# Patient Record
Sex: FEMALE | Race: WHITE | URBAN - METROPOLITAN AREA
[De-identification: names, ages, dates, MRNs, and addresses within clinical notes are randomized per-mention and may not be internally consistent; named-entity substitution may affect disease eponyms.]

---

## 2023-11-02 LAB
NON-UH HIE A/G RATIO: 1.2
NON-UH HIE ALB: 4 G/DL (ref 3.4–5)
NON-UH HIE ALK PHOS: 136 UNIT/L (ref 45–117)
NON-UH HIE BILIRUBIN, TOTAL: 0.5 MG/DL (ref 0.3–1.2)
NON-UH HIE BUN/CREAT RATIO: 10
NON-UH HIE BUN: 8 MG/DL (ref 9–23)
NON-UH HIE CALCIUM: 9.8 MG/DL (ref 8.7–10.4)
NON-UH HIE CALCULATED LDL CHOLESTEROL: 84 MG/DL (ref 60–130)
NON-UH HIE CALCULATED OSMOLALITY: 280 MOSM/KG (ref 275–295)
NON-UH HIE CHLORIDE: 101 MMOL/L (ref 98–107)
NON-UH HIE CHOLESTEROL: 178 MG/DL (ref 100–200)
NON-UH HIE CO2, VENOUS: 26 MMOL/L (ref 20–31)
NON-UH HIE CREATININE, URINE MG/DL: 66.2 MG/DL
NON-UH HIE CREATININE: 0.8 MG/DL (ref 0.5–0.8)
NON-UH HIE GFR AA: >60
NON-UH HIE GLOBULIN: 3.2 G/DL
NON-UH HIE GLOMERULAR FILTRATION RATE: >60 ML/MIN/1.73M?
NON-UH HIE GLUCOSE: 140 MG/DL (ref 74–106)
NON-UH HIE GOT: 16 UNIT/L (ref 15–37)
NON-UH HIE GPT: 22 UNIT/L (ref 10–49)
NON-UH HIE HCT: 42 % (ref 36–46)
NON-UH HIE HDL CHOLESTEROL: 50 MG/DL (ref 40–60)
NON-UH HIE HGB A1C: 6.7 %
NON-UH HIE HGB: 14.3 G/DL (ref 12–16)
NON-UH HIE INSTR WBC ND: 7.1
NON-UH HIE K: 4 MMOL/L (ref 3.5–5.1)
NON-UH HIE MCH: 32.4 PG (ref 27–34)
NON-UH HIE MCHC: 34 G/DL (ref 32–37)
NON-UH HIE MCV: 95.2 FL (ref 80–100)
NON-UH HIE MICROALBUMIN, URINE MG/L: <3 MG/L
NON-UH HIE MICROALBUMIN/CREATININE RATIO: <4 MG MALB/GM CREAT (ref 0–30)
NON-UH HIE MPV: 7.9 FL (ref 7.4–10.4)
NON-UH HIE NA: 140 MMOL/L (ref 135–145)
NON-UH HIE PLATELET: 247 X10 (ref 150–450)
NON-UH HIE RBC: 4.41 X10 (ref 4.2–5.4)
NON-UH HIE RDW: 12.9 % (ref 11.5–14.5)
NON-UH HIE TOTAL CHOL/HDL CHOL RATIO: 3.6
NON-UH HIE TOTAL PROTEIN: 7.2 G/DL (ref 5.7–8.2)
NON-UH HIE TRIGLYCERIDES: 221 MG/DL (ref 30–150)
NON-UH HIE WBC: 7.1 X10 (ref 4.5–11)

## 2023-11-20 PROBLEM — G47.33 OBSTRUCTIVE SLEEP APNEA: Status: ACTIVE | Noted: 2023-11-20

## 2023-11-20 PROBLEM — G56.00 CARPAL TUNNEL SYNDROME: Status: ACTIVE | Noted: 2023-11-20

## 2023-11-20 PROBLEM — K76.0 FATTY LIVER: Status: ACTIVE | Noted: 2023-11-20

## 2023-11-20 PROBLEM — K21.9 GERD (GASTROESOPHAGEAL REFLUX DISEASE): Status: ACTIVE | Noted: 2023-11-20

## 2023-11-20 PROBLEM — E11.9 DIABETES MELLITUS (MULTI): Status: ACTIVE | Noted: 2023-11-20

## 2023-11-20 PROBLEM — E55.9 VITAMIN D DEFICIENCY: Status: ACTIVE | Noted: 2023-11-20

## 2023-11-20 PROBLEM — F32.A DEPRESSION: Status: ACTIVE | Noted: 2023-11-20

## 2023-11-20 PROBLEM — E78.1 HYPERTRIGLYCERIDEMIA: Status: ACTIVE | Noted: 2023-11-20

## 2023-11-20 PROBLEM — I10 HYPERTENSION: Status: ACTIVE | Noted: 2023-11-20

## 2023-11-20 PROBLEM — C50.512 BREAST CANCER OF LOWER-OUTER QUADRANT OF LEFT FEMALE BREAST (MULTI): Status: ACTIVE | Noted: 2023-11-20

## 2023-11-20 RX ORDER — GLIPIZIDE 5 MG/1
1 TABLET, FILM COATED, EXTENDED RELEASE ORAL DAILY
COMMUNITY
Start: 2013-06-14 | End: 2023-11-21 | Stop reason: ALTCHOICE

## 2023-11-20 RX ORDER — VIT C/E/ZN/COPPR/LUTEIN/ZEAXAN 250MG-90MG
2 CAPSULE ORAL DAILY
COMMUNITY

## 2023-11-20 RX ORDER — HYDROCHLOROTHIAZIDE 25 MG/1
1 TABLET ORAL DAILY
COMMUNITY
Start: 2021-12-14 | End: 2024-01-08 | Stop reason: SDUPTHER

## 2023-11-20 RX ORDER — SERTRALINE HYDROCHLORIDE 25 MG/1
25 TABLET, FILM COATED ORAL DAILY
COMMUNITY
Start: 2020-08-24 | End: 2023-12-12 | Stop reason: SDUPTHER

## 2023-11-20 RX ORDER — PRAVASTATIN SODIUM 20 MG/1
20 TABLET ORAL NIGHTLY
COMMUNITY
Start: 2020-09-30 | End: 2023-11-21 | Stop reason: DRUGHIGH

## 2023-11-20 RX ORDER — METFORMIN HYDROCHLORIDE 500 MG/1
500 TABLET, EXTENDED RELEASE ORAL
COMMUNITY
Start: 2023-03-05 | End: 2023-11-21 | Stop reason: SDUPTHER

## 2023-11-21 ENCOUNTER — OFFICE VISIT (OUTPATIENT)
Dept: PRIMARY CARE | Facility: CLINIC | Age: 58
End: 2023-11-21
Payer: COMMERCIAL

## 2023-11-21 VITALS
HEART RATE: 98 BPM | OXYGEN SATURATION: 99 % | SYSTOLIC BLOOD PRESSURE: 128 MMHG | WEIGHT: 164 LBS | BODY MASS INDEX: 30.18 KG/M2 | HEIGHT: 62 IN | TEMPERATURE: 97.8 F | DIASTOLIC BLOOD PRESSURE: 88 MMHG

## 2023-11-21 DIAGNOSIS — E11.69 TYPE 2 DIABETES MELLITUS WITH OTHER SPECIFIED COMPLICATION, WITHOUT LONG-TERM CURRENT USE OF INSULIN (MULTI): Primary | ICD-10-CM

## 2023-11-21 DIAGNOSIS — E55.9 VITAMIN D DEFICIENCY: ICD-10-CM

## 2023-11-21 DIAGNOSIS — E78.1 HYPERTRIGLYCERIDEMIA: ICD-10-CM

## 2023-11-21 PROCEDURE — 3074F SYST BP LT 130 MM HG: CPT | Performed by: INTERNAL MEDICINE

## 2023-11-21 PROCEDURE — 3079F DIAST BP 80-89 MM HG: CPT | Performed by: INTERNAL MEDICINE

## 2023-11-21 PROCEDURE — 99214 OFFICE O/P EST MOD 30 MIN: CPT | Performed by: INTERNAL MEDICINE

## 2023-11-21 PROCEDURE — 1036F TOBACCO NON-USER: CPT | Performed by: INTERNAL MEDICINE

## 2023-11-21 RX ORDER — PANTOPRAZOLE SODIUM 40 MG/1
40 TABLET, DELAYED RELEASE ORAL
COMMUNITY
Start: 2023-10-19

## 2023-11-21 RX ORDER — METFORMIN HYDROCHLORIDE 500 MG/1
500 TABLET, EXTENDED RELEASE ORAL
Qty: 90 TABLET | Refills: 3 | Status: SHIPPED | OUTPATIENT
Start: 2023-11-21 | End: 2023-11-21 | Stop reason: SDUPTHER

## 2023-11-21 RX ORDER — PRAVASTATIN SODIUM 40 MG/1
40 TABLET ORAL DAILY
Qty: 90 TABLET | Refills: 3 | Status: SHIPPED | OUTPATIENT
Start: 2023-11-21

## 2023-11-21 RX ORDER — METFORMIN HYDROCHLORIDE 500 MG/1
TABLET, EXTENDED RELEASE ORAL
Qty: 90 TABLET | Refills: 3 | Status: SHIPPED | OUTPATIENT
Start: 2023-11-21 | End: 2023-12-13 | Stop reason: SDUPTHER

## 2023-11-21 ASSESSMENT — ENCOUNTER SYMPTOMS
HEADACHES: 0
VISUAL CHANGE: 0
CONFUSION: 0
HUNGER: 0
WEIGHT LOSS: 0
SEIZURES: 0
BLACKOUTS: 0
BLURRED VISION: 0
SPEECH DIFFICULTY: 0
POLYPHAGIA: 1
DIZZINESS: 0
FATIGUE: 1
NERVOUS/ANXIOUS: 0
POLYDIPSIA: 1
TREMORS: 0
SWEATS: 0
WEAKNESS: 0

## 2023-11-21 ASSESSMENT — PAIN SCALES - GENERAL: PAINLEVEL: 0-NO PAIN

## 2023-11-21 ASSESSMENT — PATIENT HEALTH QUESTIONNAIRE - PHQ9
1. LITTLE INTEREST OR PLEASURE IN DOING THINGS: NOT AT ALL
SUM OF ALL RESPONSES TO PHQ9 QUESTIONS 1 & 2: 0
2. FEELING DOWN, DEPRESSED OR HOPELESS: NOT AT ALL

## 2023-11-21 NOTE — PROGRESS NOTES
"Chief Complaint   Patient presents with    Follow-up     Pt here for 6 mo FUV.  Pt needs refill on Metformin.     Last seen 2023.    no fever/chills.  Good appetite.   Patient denies chest pain, pressure, palpitations, or shortness of breath  No GI or  symptoms  GI advised to stop glipizide  Ophtho done ok.      Past Medical History  Left breast cancer  status post lumpectomy chemotherapy radiation Dr Oh (previous oncologist Dr Calero)  Diabetes mellitus type 2 diagnosed in   Hypertriglyceridemia  Palpitations, Dr Frederick [place].   Vitamin D deficiency  Carpal tunnel syndrome  Bilateral oophorectomy, uterus intact. Previous gynecologist Dr. Barrera  Sleep apnea CPAP 6  Fatty liver: FibroScan 2020 Dr Mahmood     Allergy Keflex  Social history former smoker.  she has 2 granddaughters. Quit smoking age 23     Family history: Mother is a patient. Father  70 with colon cancer, atrial fibrillation, and cerebral hemorrhage. Father had high triglycerides    Blood pressure 128/88, pulse 98, temperature 36.6 °C (97.8 °F), height 1.575 m (5' 2\"), weight 74.4 kg (164 lb), SpO2 99 %.  Body mass index is 30 kg/m².    Vital reviewed  Neck: no cervical/clavicular adenopathy  CV: RRR S1 S2 normal. No murmur. No carotid bruit.   Lungs: CTA without wrr. Breath sounds symmetric  Abdomen: normoactive. Soft, nontender. No mass.  Extremities: no pretibial edema  Neuro: speech intact. No facial asymmetry   Skin no rash noted.     Labs 2023 D, bmp, A1c    2023 lipid, A1c, MA, cmp, cbc  178/50/84/221  Glucose elevated 140 cr 0.8 lfts negative  A1c 6.7  MA ratio <4  Wbc 7.1 hg 14.3 platelet 247    Heather was seen today for follow-up.  Diagnoses and all orders for this visit:  Type 2 diabetes mellitus with other specified complication, without long-term current use of insulin (CMS/[facility]) (Primary)  -     Discontinue: metFORMIN  mg 24 hr tablet; Take 1 tablet (500 mg) by mouth once daily in the " evening. Take with meals.  -     metFORMIN  mg 24 hr tablet; 2 po qd  -     Hemoglobin A1C; Future  -     Comprehensive Metabolic Panel; Future  Hypertriglyceridemia  -     pravastatin (Pravachol) 40 mg tablet; Take 1 tablet (40 mg) by mouth once daily.  -     Lipid Panel; Future  Vitamin D deficiency  -     Vitamin D 25-Hydroxy,Total (for eval of Vitamin D levels); Future    Increase metformin to 2 po every day  Discontinue glipizide  Ldl above goal increase pravastatin from 20 to 40 mg qd  Future labs         Left breast cancer (lumpectomy) last mammogram 11/2022- appt for mammogram next week.  Pap per gyn  Colonoscopy 11/2023 Dr Mahmood  Had flu vaccine

## 2023-12-06 ENCOUNTER — PATIENT MESSAGE (OUTPATIENT)
Dept: PRIMARY CARE | Facility: CLINIC | Age: 58
End: 2023-12-06
Payer: COMMERCIAL

## 2023-12-06 DIAGNOSIS — F32.A DEPRESSION, UNSPECIFIED DEPRESSION TYPE: Primary | ICD-10-CM

## 2023-12-11 ENCOUNTER — PATIENT MESSAGE (OUTPATIENT)
Dept: PRIMARY CARE | Facility: CLINIC | Age: 58
End: 2023-12-11
Payer: COMMERCIAL

## 2023-12-11 DIAGNOSIS — E11.69 TYPE 2 DIABETES MELLITUS WITH OTHER SPECIFIED COMPLICATION, WITHOUT LONG-TERM CURRENT USE OF INSULIN (MULTI): ICD-10-CM

## 2023-12-12 DIAGNOSIS — F32.A DEPRESSION, UNSPECIFIED DEPRESSION TYPE: ICD-10-CM

## 2023-12-12 RX ORDER — SERTRALINE HYDROCHLORIDE 25 MG/1
25 TABLET, FILM COATED ORAL DAILY
Qty: 90 TABLET | Refills: 3 | Status: CANCELLED | OUTPATIENT
Start: 2023-12-12

## 2023-12-12 RX ORDER — SERTRALINE HYDROCHLORIDE 25 MG/1
25 TABLET, FILM COATED ORAL DAILY
Qty: 90 TABLET | Refills: 3 | Status: SHIPPED | OUTPATIENT
Start: 2023-12-12

## 2023-12-13 RX ORDER — METFORMIN HYDROCHLORIDE 500 MG/1
TABLET, EXTENDED RELEASE ORAL
Qty: 360 TABLET | Refills: 3 | Status: SHIPPED | OUTPATIENT
Start: 2023-12-13 | End: 2024-02-06 | Stop reason: DRUGHIGH

## 2023-12-13 NOTE — TELEPHONE ENCOUNTER
"----- Message from Mayra Cabrera MD sent at 12/13/2023 11:27 AM EST -----  Regarding: FW: Medicine not working   Contact: [phone]  Ashley, I sent a reply yesterday. Refill sent today. Please check if she received message and if she has any questions/concerns.   ----- Message -----  From: Ashley Peña LPN  Sent: 12/13/2023  11:09 AM EST  To: Mayra Cabrera MD  Subject: FW: Medicine not working                           ----- Message -----  From: Heather Logan \"Clarissa\"  Sent: 12/11/2023   7:05 PM EST  To: Do [facility] [place] [id number] Clinical Support Staff  Subject: Medicine not working                             I stopped the Glipazide and started 2 of the  Metformin on the 22 of November my sugar has been running in the 190 and above. I don’t think the metformin is working! I didn’t want to wait till my next  appointment it is in May. My sugar has never been this high. I not eating anything different. I also need refills sent to [facility] on [facility] since my other pharmacy closed. Thank you Heather Logan       "

## 2023-12-13 NOTE — TELEPHONE ENCOUNTER
Pt returned call and confirmed that she received response from PCP and does not have questions/concerns at this time.

## 2023-12-13 NOTE — TELEPHONE ENCOUNTER
"----- Message from Mayra Cabrera MD sent at 12/13/2023 11:27 AM EST -----  Regarding: FW: Medicine not working   Contact: [phone]  Ashley, I sent a reply yesterday. Refill sent today. Please check if she received message and if she has any questions/concerns.   ----- Message -----  From: Ashley Peña LPN  Sent: 12/13/2023  11:09 AM EST  To: Mayra Cabrera MD  Subject: FW: Medicine not working                           ----- Message -----  From: Heather Lgoan \"Clarissa\"  Sent: 12/11/2023   7:05 PM EST  To: Do Mercer [place] [id number] Clinical Support Staff  Subject: Medicine not working                             I stopped the Glipazide and started 2 of the  Metformin on the 22 of November my sugar has been running in the 190 and above. I don’t think the metformin is working! I didn’t want to wait till my next  appointment it is in May. My sugar has never been this high. I not eating anything different. I also need refills sent to [facility] on [facility] since my other pharmacy closed. Thank you Heather Logan       "

## 2023-12-20 ENCOUNTER — TELEPHONE (OUTPATIENT)
Dept: PRIMARY CARE | Facility: CLINIC | Age: 58
End: 2023-12-20
Payer: COMMERCIAL

## 2023-12-20 NOTE — TELEPHONE ENCOUNTER
----- Message from Mayra Cabrera MD sent at 12/18/2023  1:09 PM EST -----  Please let patient know mammogram is normal.

## 2023-12-21 NOTE — TELEPHONE ENCOUNTER
----- Message from Mayra Cabrera MD sent at 12/21/2023 10:44 AM EST -----  Please let patient know mammogram is normal.

## 2023-12-21 NOTE — TELEPHONE ENCOUNTER
Called pt and left message regarding results seen on My Chart.  Encouraged to contact clinical with questions.

## 2023-12-22 ENCOUNTER — TELEPHONE (OUTPATIENT)
Dept: PRIMARY CARE | Facility: CLINIC | Age: 58
End: 2023-12-22
Payer: COMMERCIAL

## 2023-12-22 NOTE — TELEPHONE ENCOUNTER
----- Message from Heather Logan sent at 12/11/2023  7:05 PM EST -----  Regarding: Medicine not working   Contact: [phone]  I stopped the Glipazide and started 2 of the  Metformin on the 22 of November my sugar has been running in the 190 and above. I don’t think the metformin is working! I didn’t want to wait till my next  appointment it is in May. My sugar has never been this high. I not eating anything different. I also need refills sent to [facility] on [facility] since my other pharmacy closed. Thank you Heather Logan

## 2024-01-08 DIAGNOSIS — I10 HYPERTENSION, UNSPECIFIED TYPE: ICD-10-CM

## 2024-01-08 RX ORDER — HYDROCHLOROTHIAZIDE 25 MG/1
25 TABLET ORAL DAILY
Qty: 90 TABLET | Refills: 3 | Status: SHIPPED | OUTPATIENT
Start: 2024-01-08

## 2024-02-06 ENCOUNTER — OFFICE VISIT (OUTPATIENT)
Dept: PRIMARY CARE | Facility: CLINIC | Age: 59
End: 2024-02-06
Payer: COMMERCIAL

## 2024-02-06 VITALS
DIASTOLIC BLOOD PRESSURE: 88 MMHG | OXYGEN SATURATION: 92 % | SYSTOLIC BLOOD PRESSURE: 122 MMHG | HEART RATE: 110 BPM | HEIGHT: 62 IN | TEMPERATURE: 98 F | BODY MASS INDEX: 31.28 KG/M2 | WEIGHT: 170 LBS

## 2024-02-06 DIAGNOSIS — E11.69 TYPE 2 DIABETES MELLITUS WITH OTHER SPECIFIED COMPLICATION, WITHOUT LONG-TERM CURRENT USE OF INSULIN (MULTI): ICD-10-CM

## 2024-02-06 DIAGNOSIS — E11.69 TYPE 2 DIABETES MELLITUS WITH OTHER SPECIFIED COMPLICATION, UNSPECIFIED WHETHER LONG TERM INSULIN USE (MULTI): Primary | ICD-10-CM

## 2024-02-06 LAB — POC HEMOGLOBIN A1C: 8.5 % (ref 4.2–6.5)

## 2024-02-06 PROCEDURE — 83036 HEMOGLOBIN GLYCOSYLATED A1C: CPT | Performed by: INTERNAL MEDICINE

## 2024-02-06 PROCEDURE — 3074F SYST BP LT 130 MM HG: CPT | Performed by: INTERNAL MEDICINE

## 2024-02-06 PROCEDURE — 1036F TOBACCO NON-USER: CPT | Performed by: INTERNAL MEDICINE

## 2024-02-06 PROCEDURE — 3079F DIAST BP 80-89 MM HG: CPT | Performed by: INTERNAL MEDICINE

## 2024-02-06 PROCEDURE — 99214 OFFICE O/P EST MOD 30 MIN: CPT | Performed by: INTERNAL MEDICINE

## 2024-02-06 RX ORDER — METFORMIN HYDROCHLORIDE 500 MG/1
2000 TABLET, EXTENDED RELEASE ORAL DAILY
Qty: 360 TABLET | Refills: 0 | Status: SHIPPED
Start: 2024-02-06 | End: 2024-02-20 | Stop reason: SDUPTHER

## 2024-02-06 ASSESSMENT — PAIN SCALES - GENERAL: PAINLEVEL: 0-NO PAIN

## 2024-02-06 ASSESSMENT — PATIENT HEALTH QUESTIONNAIRE - PHQ9
2. FEELING DOWN, DEPRESSED OR HOPELESS: NOT AT ALL
SUM OF ALL RESPONSES TO PHQ9 QUESTIONS 1 & 2: 0
1. LITTLE INTEREST OR PLEASURE IN DOING THINGS: NOT AT ALL

## 2024-02-06 NOTE — PROGRESS NOTES
"Chief Complaint   Patient presents with    medication review     Pt here to discuss current medications.     Last seen  2023  GI recommended stop glipizide  Since then sugars elevated.  Running 200s.   Not changed activity or eating. Rare wheat bread. Otherwise avoids bread  Little red meat. Eats a lot of vegetables.    Past Medical History  Left breast cancer 2012 status post lumpectomy chemotherapy radiation Dr Oh (previous oncologist Dr Calero)  Diabetes mellitus type 2 diagnosed in 2012  Hypertriglyceridemia  Palpitations, Dr Frederick [place].   Vitamin D deficiency  Carpal tunnel syndrome  Bilateral oophorectomy, uterus intact. Previous gynecologist Dr. Barrera  Sleep apnea CPAP 6  Fatty liver: FibroScan 2020 Dr Mahmood     Allergy Keflex  Social history former smoker.  she has 2 granddaughters. Quit smoking age 23     Family history: Mother is a patient. Father  70 with colon cancer, atrial fibrillation, and cerebral hemorrhage. Father had high triglycerides    Blood pressure 122/88, pulse 110, temperature 36.7 °C (98 °F), height 1.575 m (5' 2\"), weight 77.1 kg (170 lb), SpO2 92 %.  Body mass index is 31.09 kg/m².    Vital reviewed  Neuro: speech intact. No facial asymmetry   Skin no rash noted.     Labs 2023 D, bmp, A1c    2023 lipid, A1c, MA, cmp, cbc  178/50/84/221  Glucose elevated 140 cr 0.8 lfts negative  A1c 6.7  MA ratio <4  Wbc 7.1 hg 14.3 platelet 247  1. Type 2 diabetes mellitus with other specified complication, unspecified whether long term insulin use (CMS/[facility])  Discussed different medication options  Declines insulin  Try jardiance  Medication use discussed with patient including potential benefits and side effects. Patient verbalized understanding and agreed to proceed.   - POCT glycosylated hemoglobin (Hb A1C) manually resulted  - empagliflozin (Jardiance) 25 mg; Take 1 tablet (25 mg) by mouth once daily.  Dispense: 30 tablet; Refill: 1  Continue metformin 4 " po every day.    Pt to send message end of next week with glucose readings.     Left breast cancer (lumpectomy) last mammogram 11/2022- appt for mammogram next week.  Pap per gyn  Colonoscopy 11/2023 Dr Mahmood  Had flu vaccine

## 2024-02-20 DIAGNOSIS — E11.69 TYPE 2 DIABETES MELLITUS WITH OTHER SPECIFIED COMPLICATION, WITHOUT LONG-TERM CURRENT USE OF INSULIN (MULTI): ICD-10-CM

## 2024-02-20 RX ORDER — METFORMIN HYDROCHLORIDE 500 MG/1
2000 TABLET, EXTENDED RELEASE ORAL DAILY
Qty: 360 TABLET | Refills: 1 | Status: SHIPPED | OUTPATIENT
Start: 2024-02-20

## 2024-03-07 DIAGNOSIS — E11.69 TYPE 2 DIABETES MELLITUS WITH OTHER SPECIFIED COMPLICATION, UNSPECIFIED WHETHER LONG TERM INSULIN USE (MULTI): ICD-10-CM

## 2024-03-07 RX ORDER — EMPAGLIFLOZIN 25 MG/1
25 TABLET, FILM COATED ORAL DAILY
Qty: 90 TABLET | Refills: 3 | Status: SHIPPED | OUTPATIENT
Start: 2024-03-07

## 2024-05-16 LAB
NON-UH HIE A/G RATIO: 1.1
NON-UH HIE ALB: 3.7 G/DL (ref 3.4–5)
NON-UH HIE ALK PHOS: 177 UNIT/L (ref 45–117)
NON-UH HIE BILIRUBIN, TOTAL: 0.4 MG/DL (ref 0.3–1.2)
NON-UH HIE BUN/CREAT RATIO: 18.8
NON-UH HIE BUN: 15 MG/DL (ref 9–23)
NON-UH HIE CALCIUM: 9.3 MG/DL (ref 8.7–10.4)
NON-UH HIE CALCULATED LDL CHOLESTEROL: 55 MG/DL (ref 60–130)
NON-UH HIE CALCULATED OSMOLALITY: 284 MOSM/KG (ref 275–295)
NON-UH HIE CHLORIDE: 102 MMOL/L (ref 98–107)
NON-UH HIE CHOLESTEROL: 169 MG/DL (ref 100–200)
NON-UH HIE CO2, VENOUS: 28 MMOL/L (ref 20–31)
NON-UH HIE CREATININE: 0.8 MG/DL (ref 0.5–0.8)
NON-UH HIE GFR AA: >60
NON-UH HIE GLOBULIN: 3.4 G/DL
NON-UH HIE GLOMERULAR FILTRATION RATE: >60 ML/MIN/1.73M?
NON-UH HIE GLUCOSE: 199 MG/DL (ref 74–106)
NON-UH HIE GOT: 19 UNIT/L (ref 15–37)
NON-UH HIE GPT: 25 UNIT/L (ref 10–49)
NON-UH HIE HDL CHOLESTEROL: 37 MG/DL (ref 40–60)
NON-UH HIE HGB A1C: 7.5 %
NON-UH HIE K: 3.9 MMOL/L (ref 3.5–5.1)
NON-UH HIE NA: 139 MMOL/L (ref 135–145)
NON-UH HIE TOTAL CHOL/HDL CHOL RATIO: 4.6
NON-UH HIE TOTAL PROTEIN: 7.1 G/DL (ref 5.7–8.2)
NON-UH HIE TRIGLYCERIDES: 383 MG/DL (ref 30–150)
NON-UH HIE VIT D 25: 39 NG/ML

## 2024-05-21 PROBLEM — K63.5 COLON POLYPS: Status: ACTIVE | Noted: 2023-11-16

## 2024-05-21 PROBLEM — Z85.3 HISTORY OF BREAST CANCER: Status: ACTIVE | Noted: 2024-05-21

## 2024-05-21 PROBLEM — R10.31 ABDOMINAL PAIN, RLQ: Status: ACTIVE | Noted: 2024-05-21

## 2024-05-21 PROBLEM — N87.1 MODERATE DYSPLASIA OF CERVIX: Status: ACTIVE | Noted: 2024-05-21

## 2024-05-21 PROBLEM — M89.9 OSTEOPATHIA: Status: ACTIVE | Noted: 2024-05-21

## 2024-05-21 PROBLEM — N39.0 URINARY TRACT INFECTION: Status: ACTIVE | Noted: 2024-05-21

## 2024-05-21 PROBLEM — R10.32 ABDOMINAL PAIN, LLQ: Status: ACTIVE | Noted: 2024-05-21

## 2024-05-21 PROBLEM — E78.00 HIGH CHOLESTEROL: Status: ACTIVE | Noted: 2023-11-20

## 2024-05-21 PROBLEM — N91.2 AMENORRHEA: Status: ACTIVE | Noted: 2024-05-21

## 2024-05-21 PROBLEM — E66.3 OVER WEIGHT: Status: ACTIVE | Noted: 2024-05-21

## 2024-05-21 PROBLEM — R35.0 FREQUENT URINATION: Status: ACTIVE | Noted: 2024-05-21

## 2024-05-21 PROBLEM — R74.8 ELEVATED ALKALINE PHOSPHATASE LEVEL: Status: ACTIVE | Noted: 2024-05-21

## 2024-05-21 PROBLEM — K29.70 GASTRITIS: Status: ACTIVE | Noted: 2023-11-16

## 2024-05-21 PROBLEM — R87.612 LOW GRADE SQUAMOUS INTRAEPITHELIAL LESION (LGSIL) ON CERVICOVAGINAL CYTOLOGIC SMEAR: Status: ACTIVE | Noted: 2024-05-21

## 2024-05-21 PROBLEM — K57.30 COLON, DIVERTICULOSIS: Status: ACTIVE | Noted: 2023-11-16

## 2024-05-21 PROBLEM — I10 HIGH BLOOD PRESSURE: Status: RESOLVED | Noted: 2023-11-20 | Resolved: 2024-05-21

## 2024-05-21 PROBLEM — D68.59 THROMBOPHILIA (MULTI): Status: ACTIVE | Noted: 2024-05-21

## 2024-05-21 PROBLEM — C50.411: Status: ACTIVE | Noted: 2024-05-21

## 2024-05-21 PROBLEM — K82.9 GALLBLADDER DISEASE: Status: ACTIVE | Noted: 2024-05-21

## 2024-05-21 PROBLEM — K22.9 IRREGULAR Z LINE OF ESOPHAGUS: Status: ACTIVE | Noted: 2023-11-16

## 2024-05-22 ENCOUNTER — OFFICE VISIT (OUTPATIENT)
Dept: PRIMARY CARE | Facility: CLINIC | Age: 59
End: 2024-05-22
Payer: COMMERCIAL

## 2024-05-22 VITALS
OXYGEN SATURATION: 98 % | DIASTOLIC BLOOD PRESSURE: 88 MMHG | BODY MASS INDEX: 30.03 KG/M2 | WEIGHT: 163.2 LBS | TEMPERATURE: 97.2 F | HEART RATE: 96 BPM | SYSTOLIC BLOOD PRESSURE: 138 MMHG | HEIGHT: 62 IN

## 2024-05-22 DIAGNOSIS — R74.8 ALKALINE PHOSPHATASE ELEVATION: ICD-10-CM

## 2024-05-22 DIAGNOSIS — K12.1 MOUTH ULCER: ICD-10-CM

## 2024-05-22 DIAGNOSIS — E11.69 TYPE 2 DIABETES MELLITUS WITH OTHER SPECIFIED COMPLICATION, UNSPECIFIED WHETHER LONG TERM INSULIN USE (MULTI): Primary | ICD-10-CM

## 2024-05-22 PROCEDURE — 1036F TOBACCO NON-USER: CPT | Performed by: INTERNAL MEDICINE

## 2024-05-22 PROCEDURE — 99214 OFFICE O/P EST MOD 30 MIN: CPT | Performed by: INTERNAL MEDICINE

## 2024-05-22 PROCEDURE — 3079F DIAST BP 80-89 MM HG: CPT | Performed by: INTERNAL MEDICINE

## 2024-05-22 PROCEDURE — 3075F SYST BP GE 130 - 139MM HG: CPT | Performed by: INTERNAL MEDICINE

## 2024-05-22 RX ORDER — VALACYCLOVIR HYDROCHLORIDE 1 G/1
2000 TABLET, FILM COATED ORAL 2 TIMES DAILY
Qty: 4 TABLET | Refills: 0 | Status: SHIPPED | OUTPATIENT
Start: 2024-05-22 | End: 2024-05-23

## 2024-05-22 ASSESSMENT — ENCOUNTER SYMPTOMS
DIZZINESS: 0
WEIGHT LOSS: 0
FATIGUE: 1
BLURRED VISION: 0
CONFUSION: 0
POLYPHAGIA: 1
BLACKOUTS: 0
WEAKNESS: 0
POLYDIPSIA: 1
VISUAL CHANGE: 0
HUNGER: 1
SPEECH DIFFICULTY: 0
NERVOUS/ANXIOUS: 1
HEADACHES: 1

## 2024-05-22 ASSESSMENT — PATIENT HEALTH QUESTIONNAIRE - PHQ9
2. FEELING DOWN, DEPRESSED OR HOPELESS: NOT AT ALL
1. LITTLE INTEREST OR PLEASURE IN DOING THINGS: NOT AT ALL
SUM OF ALL RESPONSES TO PHQ9 QUESTIONS 1 & 2: 0

## 2024-05-22 ASSESSMENT — PAIN SCALES - GENERAL: PAINLEVEL: 0-NO PAIN

## 2024-05-22 NOTE — PROGRESS NOTES
"Chief Complaint   Patient presents with    Follow-up     Pt here for 6 mo FUV     Last seen  2024.   At last visit discussed GI recommended stop glipizide.  sugars  200s.  Started on jardiance.   Right mouth sore started today. Thinks she might have bitten her cheek but not sure. Does use a mouth guard but does not think that it was rubbing on the area.       Past Medical History  Left breast cancer  status post lumpectomy chemotherapy radiation Dr Oh (previous oncologist Dr Calero)  Diabetes mellitus type 2 diagnosed in   Hypertriglyceridemia  Palpitations, Dr Frederick [place].   Vitamin D deficiency  Carpal tunnel syndrome  Bilateral oophorectomy, uterus intact. Previous gynecologist Dr. Barrera  Sleep apnea CPAP 6  Fatty liver: FibroScan 2020 Dr Mahmood     Allergy Keflex  Social history former smoker.  she has 2 granddaughters. Quit smoking age 23     Family history: Mother is a patient. Father  70 with colon cancer, atrial fibrillation, and cerebral hemorrhage. Father had high triglycerides    Blood pressure 138/88, pulse 96, temperature 36.2 °C (97.2 °F), height 1.575 m (5' 2\"), weight 74 kg (163 lb 3.2 oz), SpO2 98%.  Body mass index is 29.85 kg/m².      Vital reviewed  Neck: no cervical/clavicular adenopathy  CV: RRR S1 S2 normal. No murmur. No carotid bruit.   Lungs: CTA without wrr. Breath sounds symmetric  Abdomen: normoactive. Soft, Extremities: no pretibial edema  Neuro: speech intact. No tremor noted      2024 A1c, lipid, cmp, d  A1c 7.4  Alk 177 cr 0.8  169//37/55/383    2023 lipid, A1c, MA, cmp, cbc  178/50/84/221  Glucose elevated 140 cr 0.8 lfts negative  A1c 6.7  MA ratio <4  Wbc 7.1 hg 14.3 platelet 247    1. Type 2 diabetes mellitus with other specified complication, unspecified whether long term insulin use (Multi)  - semaglutide 0.25 mg or 0.5 mg (2 mg/3 mL) pen injector; Inject 0.25 mg under the skin 1 (one) time per week.  Dispense: 3 mL; Refill: " 1  Medication use discussed with patient including potential benefits and side effects. Patient verbalized understanding and agreed to proceed.   F/up 1 month to evaluate response.   - Albumin , Urine Random; Future  - Comprehensive Metabolic Panel; Future  - Hemoglobin A1C; Future  - CBC; Future    2. Mouth ulcer  - valACYclovir (Valtrex) 1 gram tablet; Take 2 tablets (2,000 mg) by mouth 2 times a day for 1 day.  Dispense: 4 tablet; Refill: 0    3. Alkaline phosphatase elevation  - Alkaline Phosphatase, Isoenzymes; Future    Left breast cancer (lumpectomy) last mammogram 11/2022- appt for mammogram next week.  Pap per gyn  Colonoscopy 11/2023 Dr Mahmood    Current Outpatient Medications on File Prior to Visit   Medication Sig Dispense Refill    cholecalciferol (Vitamin D-3) 25 MCG (1000 UT) capsule Take 2 capsules (50 mcg) by mouth once daily.      empagliflozin (Jardiance) 25 mg TAKE 1 TABLET(25 MG) BY MOUTH EVERY DAY 90 tablet 3    hydroCHLOROthiazide (HYDRODiuril) 25 mg tablet Take 1 tablet (25 mg) by mouth once daily. 90 tablet 3    metFORMIN  mg 24 hr tablet Take 4 tablets (2,000 mg) by mouth once daily. 3 po every day for a week if tolerated increase to 4 po every day. 360 tablet 1    pantoprazole (ProtoNix) 40 mg EC tablet Take 1 tablet (40 mg) by mouth once daily in the morning. Take before meals.      pravastatin (Pravachol) 40 mg tablet Take 1 tablet (40 mg) by mouth once daily. 90 tablet 3    sertraline (Zoloft) 25 mg tablet Take 1 tablet (25 mg) by mouth once daily. 90 tablet 3     No current facility-administered medications on file prior to visit.       Answers submitted by the patient for this visit:  Diabetes Questionnaire (Submitted on 5/22/2024)  Chief Complaint: Diabetes problem  Diabetes type: type 2  MedicAlert ID: No  blurred vision: No  chest pain: No  fatigue: Yes  foot paresthesias: No  foot ulcerations: No  polydipsia: Yes  polyphagia: Yes  polyuria: No  visual change: No  weakness:  No  weight loss: No  Symptom course: stable  confusion: No  dizziness: No  headaches: Yes  hunger: Yes  mood changes: No  nervous/anxious: Yes  sleepiness: Yes  speech difficulty: No  blackouts: No  hospitalization: No  nocturnal hypoglycemia: No  required assistance: No  required glucagon: No  CVA: No  heart disease: No  impotence: No  nephropathy: No  peripheral neuropathy: No  PVD: No  retinopathy: No  CAD risks: dyslipidemia, hypertension, obesity  Current treatments: diet, oral agent (dual therapy)  Treatment compliance: all of the time  Home blood tests: 1-2 x per day  Home urines: <1 x per month  Monitoring compliance: good  Blood glucose trend: increasing steadily  breakfast time: 5-6 am  breakfast glucose level: 140-180  High score: 180-200  Overall: 140-180  Weight trend: fluctuating minimally  Current diet: generally healthy  Meal planning: avoidance of concentrated sweets, calorie counting, carbohydrate counting  Exercise: three times a week  Dietitian visit: No  Eye exam current: Yes  Sees podiatrist: No

## 2024-05-23 ENCOUNTER — TELEPHONE (OUTPATIENT)
Dept: CARDIOLOGY | Facility: CLINIC | Age: 59
End: 2024-05-23
Payer: COMMERCIAL

## 2024-06-21 ENCOUNTER — TELEPHONE (OUTPATIENT)
Dept: PRIMARY CARE | Facility: CLINIC | Age: 59
End: 2024-06-21

## 2024-06-21 ENCOUNTER — APPOINTMENT (OUTPATIENT)
Dept: PRIMARY CARE | Facility: CLINIC | Age: 59
End: 2024-06-21
Payer: COMMERCIAL

## 2024-06-21 VITALS
SYSTOLIC BLOOD PRESSURE: 128 MMHG | HEIGHT: 62 IN | WEIGHT: 158.6 LBS | DIASTOLIC BLOOD PRESSURE: 82 MMHG | HEART RATE: 95 BPM | OXYGEN SATURATION: 96 % | TEMPERATURE: 97.2 F | BODY MASS INDEX: 29.19 KG/M2

## 2024-06-21 DIAGNOSIS — E11.65 TYPE 2 DIABETES MELLITUS WITH HYPERGLYCEMIA, WITHOUT LONG-TERM CURRENT USE OF INSULIN (MULTI): ICD-10-CM

## 2024-06-21 DIAGNOSIS — E11.65 TYPE 2 DIABETES MELLITUS WITH HYPERGLYCEMIA, WITHOUT LONG-TERM CURRENT USE OF INSULIN (MULTI): Primary | ICD-10-CM

## 2024-06-21 DIAGNOSIS — E66.3 OVERWEIGHT WITH BODY MASS INDEX (BMI) OF 29 TO 29.9 IN ADULT: ICD-10-CM

## 2024-06-21 DIAGNOSIS — E11.69 TYPE 2 DIABETES MELLITUS WITH OTHER SPECIFIED COMPLICATION, UNSPECIFIED WHETHER LONG TERM INSULIN USE (MULTI): ICD-10-CM

## 2024-06-21 PROCEDURE — 3008F BODY MASS INDEX DOCD: CPT | Performed by: INTERNAL MEDICINE

## 2024-06-21 PROCEDURE — 3074F SYST BP LT 130 MM HG: CPT | Performed by: INTERNAL MEDICINE

## 2024-06-21 PROCEDURE — 1036F TOBACCO NON-USER: CPT | Performed by: INTERNAL MEDICINE

## 2024-06-21 PROCEDURE — 99214 OFFICE O/P EST MOD 30 MIN: CPT | Performed by: INTERNAL MEDICINE

## 2024-06-21 PROCEDURE — 3079F DIAST BP 80-89 MM HG: CPT | Performed by: INTERNAL MEDICINE

## 2024-06-21 RX ORDER — FLASH GLUCOSE SENSOR
KIT MISCELLANEOUS
Qty: 6 EACH | Refills: 3 | Status: SHIPPED | OUTPATIENT
Start: 2024-06-21

## 2024-06-21 RX ORDER — FLASH GLUCOSE SENSOR
KIT MISCELLANEOUS
Qty: 6 EACH | Refills: 3 | Status: SHIPPED | OUTPATIENT
Start: 2024-06-21 | End: 2024-06-21 | Stop reason: SDUPTHER

## 2024-06-21 RX ORDER — FLASH GLUCOSE SCANNING READER
EACH MISCELLANEOUS
Qty: 1 EACH | Refills: 0 | Status: SHIPPED | OUTPATIENT
Start: 2024-06-21

## 2024-06-21 RX ORDER — FLASH GLUCOSE SCANNING READER
EACH MISCELLANEOUS
Qty: 1 EACH | Refills: 0 | Status: SHIPPED | OUTPATIENT
Start: 2024-06-21 | End: 2024-06-21 | Stop reason: SDUPTHER

## 2024-06-21 ASSESSMENT — PAIN SCALES - GENERAL: PAINLEVEL: 0-NO PAIN

## 2024-06-21 ASSESSMENT — ENCOUNTER SYMPTOMS
VISUAL CHANGE: 0
WEAKNESS: 0
HUNGER: 0
FATIGUE: 0
BLURRED VISION: 0
POLYDIPSIA: 0
POLYPHAGIA: 0
WEIGHT LOSS: 0
HEADACHES: 0
SPEECH DIFFICULTY: 0
TREMORS: 0
SWEATS: 0
CONFUSION: 0
DIZZINESS: 0
SEIZURES: 0
NERVOUS/ANXIOUS: 0
BLACKOUTS: 0

## 2024-06-21 ASSESSMENT — PATIENT HEALTH QUESTIONNAIRE - PHQ9
SUM OF ALL RESPONSES TO PHQ9 QUESTIONS 1 & 2: 0
1. LITTLE INTEREST OR PLEASURE IN DOING THINGS: NOT AT ALL
2. FEELING DOWN, DEPRESSED OR HOPELESS: NOT AT ALL

## 2024-06-21 NOTE — TELEPHONE ENCOUNTER
Received call from Lynnette, pharmacist at [facility].  She needs a new order for the Freestyle adrianne wanting to know how many times daily pt is to check blood sugar.

## 2024-06-21 NOTE — PROGRESS NOTES
"Chief Complaint   Patient presents with    Follow-up     Pt here for 1 mo FUV     Last seen  2024.   Tolerating ozempic. No n/v/abd pain, or constipation.  Feels johnson quicker.   Brought in list of glucose readings. Checking bid. Still high but most recent under 200      Past Medical History  Left breast cancer  status post lumpectomy chemotherapy radiation Dr Oh (previous oncologist Dr Calero)  Diabetes mellitus type 2 diagnosed in 2012  Hypertriglyceridemia  Palpitations, [facility] [place].   Vitamin D deficiency  Carpal tunnel syndrome  Bilateral oophorectomy, uterus intact. Previous gynecologist Dr. Barrera  Sleep apnea CPAP 6  Fatty liver: FibroScan 2020 Dr Mahmood     Allergy Keflex  Social history former smoker.  she has 2 granddaughters. Quit smoking age 23     Family history: Mother is a patient. Father  70 with colon cancer, atrial fibrillation, and cerebral hemorrhage. Father had high triglycerides    Blood pressure 128/82, pulse 95, temperature 36.2 °C (97.2 °F), height 1.575 m (5' 2\"), weight 71.9 kg (158 lb 9.6 oz), SpO2 96%.  Body mass index is 29.01 kg/m².      Vital reviewed  Neck: no cervical/clavicular adenopathy  CV: RRR S1 S2 normal. No murmur. No carotid bruit.   Lungs: CTA without wrr. Breath sounds symmetric  Abdomen: normoactive. Soft, nontender. No mass.  Extremities: no pretibial edema  Neuro: speech intact.       2024 A1c, lipid, cmp, d  A1c 7.4  Alk 177 cr 0.8  169//37/55/383    2023 lipid, A1c, MA, cmp, cbc  178/50/84/221  Glucose elevated 140 cr 0.8 lfts negative  A1c 6.7  MA ratio <4  Wbc 7.1 hg 14.3 platelet 247    1. Type 2 diabetes mellitus with hyperglycemia, without long-term current use of insulin (Multi)  Increase to 0.5  Medication use discussed with patient including potential benefits and side effects. Patient verbalized understanding and agreed to proceed.     - semaglutide 0.25 mg or 0.5 mg (2 mg/3 mL) pen injector; Inject 0.5 mg " under the skin 1 (one) time per week.  Dispense: 3 mL; Refill: 2  - flash glucose sensor kit (FreeStyle María 2 Sensor) kit; Use as instructed  Dispense: 6 each; Refill: 3  - flash glucose scanning reader (FreeStyle María 2 [place]) misc; Use as instructed  Dispense: 1 each; Refill: 0    2. Overweight with body mass index (BMI) of 29 to 29.9 in adult  As above    Fup August      Left breast cancer (lumpectomy) last mammogram 11/2022- appt for mammogram next week.  Pap per gyn  Colonoscopy 11/2023 Dr Mahmood    Current Outpatient Medications on File Prior to Visit   Medication Sig Dispense Refill    cholecalciferol (Vitamin D-3) 25 MCG (1000 UT) capsule Take 2 capsules (50 mcg) by mouth once daily.      empagliflozin (Jardiance) 25 mg TAKE 1 TABLET(25 MG) BY MOUTH EVERY DAY 90 tablet 3    hydroCHLOROthiazide (HYDRODiuril) 25 mg tablet Take 1 tablet (25 mg) by mouth once daily. 90 tablet 3    metFORMIN  mg 24 hr tablet Take 4 tablets (2,000 mg) by mouth once daily. 3 po every day for a week if tolerated increase to 4 po every day. (Patient taking differently: Take 4 tablets (2,000 mg) by mouth once daily.) 360 tablet 1    pantoprazole (ProtoNix) 40 mg EC tablet Take 1 tablet (40 mg) by mouth once daily in the morning. Take before meals.      pravastatin (Pravachol) 40 mg tablet Take 1 tablet (40 mg) by mouth once daily. 90 tablet 3    semaglutide 0.25 mg or 0.5 mg (2 mg/3 mL) pen injector Inject 0.25 mg under the skin 1 (one) time per week. 3 mL 1    sertraline (Zoloft) 25 mg tablet Take 1 tablet (25 mg) by mouth once daily. 90 tablet 3     No current facility-administered medications on file prior to visit.     Answers submitted by the patient for this visit:  Diabetes Questionnaire (Submitted on 6/21/2024)  Chief Complaint: Diabetes problem  Diabetes type: type 2  MedicAlert ID: No  blurred vision: No  chest pain: No  fatigue: No  foot paresthesias: No  foot ulcerations: No  polydipsia: No  polyphagia:  No  polyuria: No  visual change: No  weakness: No  weight loss: No  Symptom course: stable  confusion: No  dizziness: No  headaches: No  hunger: No  mood changes: No  nervous/anxious: No  pallor: No  seizures: No  sleepiness: Yes  speech difficulty: No  sweats: No  tremors: No  blackouts: No  hospitalization: No  nocturnal hypoglycemia: No  required assistance: No  required glucagon: No  CVA: No  heart disease: No  impotence: No  nephropathy: No  peripheral neuropathy: No  PVD: No  retinopathy: No  CAD risks: dyslipidemia, hypertension, obesity, post-menopausal  Current treatments: oral agent (triple therapy)  Treatment compliance: all of the time  Home blood tests: 1-2 x per day  Home urines: <1 x per month  Monitoring compliance: good  Blood glucose trend: fluctuating dramatically  breakfast time: 6-7 am  breakfast glucose level: 140-180  High score: 140-180  Overall: 140-180  Weight trend: stable  Current diet: diabetic  Meal planning: calorie counting  Exercise: three times a week  Dietitian visit: No  Eye exam current: Yes  Sees podiatrist: No

## 2024-08-18 DIAGNOSIS — E11.69 TYPE 2 DIABETES MELLITUS WITH OTHER SPECIFIED COMPLICATION, WITHOUT LONG-TERM CURRENT USE OF INSULIN (MULTI): ICD-10-CM

## 2024-08-20 RX ORDER — METFORMIN HYDROCHLORIDE 500 MG/1
2000 TABLET, EXTENDED RELEASE ORAL DAILY
Qty: 360 TABLET | Refills: 3 | Status: SHIPPED | OUTPATIENT
Start: 2024-08-20

## 2024-08-21 ENCOUNTER — LAB (OUTPATIENT)
Dept: LAB | Facility: LAB | Age: 59
End: 2024-08-21
Payer: COMMERCIAL

## 2024-08-21 DIAGNOSIS — E11.69 TYPE 2 DIABETES MELLITUS WITH OTHER SPECIFIED COMPLICATION, WITHOUT LONG-TERM CURRENT USE OF INSULIN (MULTI): ICD-10-CM

## 2024-08-21 DIAGNOSIS — E55.9 VITAMIN D DEFICIENCY: ICD-10-CM

## 2024-08-21 DIAGNOSIS — R74.8 ALKALINE PHOSPHATASE ELEVATION: ICD-10-CM

## 2024-08-21 DIAGNOSIS — E11.69 TYPE 2 DIABETES MELLITUS WITH OTHER SPECIFIED COMPLICATION, UNSPECIFIED WHETHER LONG TERM INSULIN USE (MULTI): ICD-10-CM

## 2024-08-21 DIAGNOSIS — E78.1 HYPERTRIGLYCERIDEMIA: ICD-10-CM

## 2024-08-21 LAB
25(OH)D3 SERPL-MCNC: 47 NG/ML (ref 30–100)
ALBUMIN SERPL BCP-MCNC: 4.8 G/DL (ref 3.4–5)
ALP SERPL-CCNC: 103 U/L (ref 33–136)
ALT SERPL W P-5'-P-CCNC: 26 U/L (ref 7–45)
ANION GAP SERPL CALC-SCNC: 16 MMOL/L (ref 10–20)
AST SERPL W P-5'-P-CCNC: 25 U/L (ref 9–39)
BILIRUB SERPL-MCNC: 0.7 MG/DL (ref 0–1.2)
BUN SERPL-MCNC: 11 MG/DL (ref 6–23)
CALCIUM SERPL-MCNC: 10 MG/DL (ref 8.6–10.3)
CHLORIDE SERPL-SCNC: 98 MMOL/L (ref 98–107)
CHOLEST SERPL-MCNC: 165 MG/DL (ref 0–199)
CHOLESTEROL/HDL RATIO: 4
CO2 SERPL-SCNC: 29 MMOL/L (ref 21–32)
CREAT SERPL-MCNC: 0.83 MG/DL (ref 0.5–1.05)
CREAT UR-MCNC: 38.3 MG/DL (ref 20–320)
EGFRCR SERPLBLD CKD-EPI 2021: 81 ML/MIN/1.73M*2
ERYTHROCYTE [DISTWIDTH] IN BLOOD BY AUTOMATED COUNT: 13.2 % (ref 11.5–14.5)
GLUCOSE SERPL-MCNC: 143 MG/DL (ref 74–99)
HCT VFR BLD AUTO: 51 % (ref 36–46)
HDLC SERPL-MCNC: 41.5 MG/DL
HGB BLD-MCNC: 17 G/DL (ref 12–16)
LDLC SERPL CALC-MCNC: 64 MG/DL
MCH RBC QN AUTO: 32.1 PG (ref 26–34)
MCHC RBC AUTO-ENTMCNC: 33.3 G/DL (ref 32–36)
MCV RBC AUTO: 96 FL (ref 80–100)
MICROALBUMIN UR-MCNC: 80.4 MG/L
MICROALBUMIN/CREAT UR: 209.9 UG/MG CREAT
NON HDL CHOLESTEROL: 124 MG/DL (ref 0–149)
NRBC BLD-RTO: 0 /100 WBCS (ref 0–0)
PLATELET # BLD AUTO: 326 X10*3/UL (ref 150–450)
POTASSIUM SERPL-SCNC: 4 MMOL/L (ref 3.5–5.3)
PROT SERPL-MCNC: 7.9 G/DL (ref 6.4–8.2)
RBC # BLD AUTO: 5.3 X10*6/UL (ref 4–5.2)
SODIUM SERPL-SCNC: 139 MMOL/L (ref 136–145)
TRIGL SERPL-MCNC: 296 MG/DL (ref 0–149)
VLDL: 59 MG/DL (ref 0–40)
WBC # BLD AUTO: 6.7 X10*3/UL (ref 4.4–11.3)

## 2024-08-21 PROCEDURE — 84080 ASSAY ALKALINE PHOSPHATASES: CPT

## 2024-08-21 PROCEDURE — 80053 COMPREHEN METABOLIC PANEL: CPT

## 2024-08-21 PROCEDURE — 82306 VITAMIN D 25 HYDROXY: CPT

## 2024-08-21 PROCEDURE — 36415 COLL VENOUS BLD VENIPUNCTURE: CPT

## 2024-08-21 PROCEDURE — 80061 LIPID PANEL: CPT

## 2024-08-21 PROCEDURE — 82043 UR ALBUMIN QUANTITATIVE: CPT

## 2024-08-21 PROCEDURE — 85027 COMPLETE CBC AUTOMATED: CPT

## 2024-08-21 PROCEDURE — 83036 HEMOGLOBIN GLYCOSYLATED A1C: CPT

## 2024-08-21 PROCEDURE — 82570 ASSAY OF URINE CREATININE: CPT

## 2024-08-22 LAB
EST. AVERAGE GLUCOSE BLD GHB EST-MCNC: 146 MG/DL
HBA1C MFR BLD: 6.7 %

## 2024-08-24 LAB
ALP BONE SERPL-CCNC: 65 U/L (ref 0–55)
ALP LIVER SERPL-CCNC: 65 U/L (ref 0–94)
ALP OTHER SERPL-CCNC: 0 U/L
ALP SERPL-CCNC: 129 U/L (ref 40–120)

## 2024-08-27 ENCOUNTER — APPOINTMENT (OUTPATIENT)
Dept: PRIMARY CARE | Facility: CLINIC | Age: 59
End: 2024-08-27
Payer: COMMERCIAL

## 2024-08-27 VITALS
BODY MASS INDEX: 28.05 KG/M2 | TEMPERATURE: 97.7 F | HEART RATE: 110 BPM | HEIGHT: 62 IN | SYSTOLIC BLOOD PRESSURE: 108 MMHG | DIASTOLIC BLOOD PRESSURE: 72 MMHG | OXYGEN SATURATION: 95 % | WEIGHT: 152.4 LBS

## 2024-08-27 DIAGNOSIS — E78.1 HYPERTRIGLYCERIDEMIA: ICD-10-CM

## 2024-08-27 DIAGNOSIS — I10 HYPERTENSION, UNSPECIFIED TYPE: ICD-10-CM

## 2024-08-27 DIAGNOSIS — F32.A DEPRESSION, UNSPECIFIED DEPRESSION TYPE: ICD-10-CM

## 2024-08-27 DIAGNOSIS — E11.69 TYPE 2 DIABETES MELLITUS WITH OTHER SPECIFIED COMPLICATION, UNSPECIFIED WHETHER LONG TERM INSULIN USE (MULTI): ICD-10-CM

## 2024-08-27 DIAGNOSIS — R74.8 ALKALINE PHOSPHATASE ELEVATION: Primary | ICD-10-CM

## 2024-08-27 PROCEDURE — 3008F BODY MASS INDEX DOCD: CPT | Performed by: INTERNAL MEDICINE

## 2024-08-27 PROCEDURE — 3044F HG A1C LEVEL LT 7.0%: CPT | Performed by: INTERNAL MEDICINE

## 2024-08-27 PROCEDURE — 99214 OFFICE O/P EST MOD 30 MIN: CPT | Performed by: INTERNAL MEDICINE

## 2024-08-27 PROCEDURE — 3078F DIAST BP <80 MM HG: CPT | Performed by: INTERNAL MEDICINE

## 2024-08-27 PROCEDURE — 3074F SYST BP LT 130 MM HG: CPT | Performed by: INTERNAL MEDICINE

## 2024-08-27 PROCEDURE — 3060F POS MICROALBUMINURIA REV: CPT | Performed by: INTERNAL MEDICINE

## 2024-08-27 PROCEDURE — 3048F LDL-C <100 MG/DL: CPT | Performed by: INTERNAL MEDICINE

## 2024-08-27 PROCEDURE — 1036F TOBACCO NON-USER: CPT | Performed by: INTERNAL MEDICINE

## 2024-08-27 RX ORDER — HYDROCHLOROTHIAZIDE 12.5 MG/1
12.5 TABLET ORAL DAILY
Qty: 90 TABLET | Refills: 3 | Status: SHIPPED | OUTPATIENT
Start: 2024-08-27

## 2024-08-27 RX ORDER — PRAVASTATIN SODIUM 40 MG/1
40 TABLET ORAL DAILY
Qty: 90 TABLET | Refills: 3 | Status: SHIPPED | OUTPATIENT
Start: 2024-08-27

## 2024-08-27 RX ORDER — HYDROCHLOROTHIAZIDE 25 MG/1
25 TABLET ORAL DAILY
Qty: 90 TABLET | Refills: 3 | Status: SHIPPED | OUTPATIENT
Start: 2024-08-27 | End: 2024-08-27 | Stop reason: DRUGHIGH

## 2024-08-27 RX ORDER — SERTRALINE HYDROCHLORIDE 25 MG/1
25 TABLET, FILM COATED ORAL DAILY
Qty: 90 TABLET | Refills: 3 | Status: SHIPPED | OUTPATIENT
Start: 2024-08-27

## 2024-08-27 ASSESSMENT — PAIN SCALES - GENERAL: PAINLEVEL: 0-NO PAIN

## 2024-08-27 NOTE — PROGRESS NOTES
"Chief Complaint   Patient presents with    Follow-up     Pt here for 2 mo FUV     Last seen  2024.   Tolerating ozempic.   Early afternoon 2 times felt lightheaded responded to water  No chest pain. No palpitations.   Planning to take gc to [facility]    Past Medical History  Left breast cancer 2012 status post lumpectomy chemotherapy radiation Dr Oh (previous oncologist Dr Calero)  Diabetes mellitus type 2 diagnosed in   Hypertriglyceridemia  Palpitations, Lalo [place].   Vitamin D deficiency  Carpal tunnel syndrome  Bilateral oophorectomy, uterus intact. Previous gynecologist Dr. Barrera  Sleep apnea CPAP 6  Fatty liver: FibroScan 2020 Dr Mahmood     Allergy Keflex  Social history former smoker.  she has 2 granddaughters. Quit smoking age 23     Family history: Mother is a patient. Father  70 with colon cancer, atrial fibrillation, and cerebral hemorrhage. Father had high triglycerides    Blood pressure 108/72, pulse 110, temperature 36.5 °C (97.7 °F), height 1.575 m (5' 2\"), weight 69.1 kg (152 lb 6.4 oz), SpO2 95%.  Body mass index is 27.87 kg/m².    Lab Results   Component Value Date    WBC 6.7 2024    HGB 17.0 (H) 2024    HCT 51.0 (H) 2024    MCV 96 2024     2024     Lab Results   Component Value Date    GLUCOSE 143 (H) 2024    CALCIUM 10.0 2024     2024    K 4.0 2024    CO2 29 2024    CL 98 2024    BUN 11 2024    CREATININE 0.83 2024     Lab Results   Component Value Date    CHOL 165 2024    CHOL 148 2021    CHOL 167 2019     Lab Results   Component Value Date    HDL 41.5 2024    HDL 41.0 2021    HDL 37.0 (A) 2019     Lab Results   Component Value Date    LDLCALC 64 2024     Lab Results   Component Value Date    TRIG 296 (H) 2024    TRIG 235 (H) 2021    TRIG 248 (H) 2019     No components found for: \"CHOLHDL\"   Lab " Results   Component Value Date    TSH 2.44 07/08/2021      Lab Results   Component Value Date    HGBA1C 6.7 (H) 08/21/2024      Lab Results   Component Value Date    ALBUMINUR 80.4 08/21/2024      MA ratio 209      5/2024 A1c, lipid, cmp, d  A1c 7.4  Alk 177 cr 0.8  169//37/55/383    11/2023 lipid, A1c, MA, cmp, cbc  178/50/84/221  Glucose elevated 140 cr 0.8 lfts negative  A1c 6.7  MA ratio <4  Wbc 7.1 hg 14.3 platelet 247    1. Depression, unspecified depression type  - sertraline (Zoloft) 25 mg tablet; Take 1 tablet (25 mg) by mouth once daily.  Dispense: 90 tablet; Refill: 3    2. Hypertriglyceridemia  - pravastatin (Pravachol) 40 mg tablet; Take 1 tablet (40 mg) by mouth once daily.  Dispense: 90 tablet; Refill: 3    3. Hypertension, unspecified type  Well controlled. Sometimes lightheaded. Decrease hydrochlorothiazide to 12.5. increase water intake.  Check bp and pulse when this happens  - hydroCHLOROthiazide (Microzide) 12.5 mg tablet; Take 1 tablet (12.5 mg) by mouth once daily.  Dispense: 90 tablet; Refill: 3  - Comprehensive Metabolic Panel; Future    4. Type 2 diabetes mellitus with other specified complication, unspecified whether long term insulin use (Multi)  - empagliflozin (Jardiance) 25 mg; Take 1 tablet (25 mg) by mouth once daily.  Dispense: 90 tablet; Refill: 3  Elevated MA -pt with high K with lisinopril. Will avoid ace. Recheck MA with next labs. Consider arb in future   - Comprehensive Metabolic Panel; Future  - Hemoglobin A1C; Future  - CBC; Future  - Albumin-Creatinine Ratio, Urine Random; Future    5. Alkaline phosphatase elevation   Sometimes hip pain. Obtain imaging. D/w pt  - XR lumbar spine complete 4+ views; Future  - XR thoracic spine 3 views; Future  - XR cervical spine 2-3 views; Future  - XR hip left with pelvis when performed 2 or 3 views; Future  - XR hip right with pelvis when performed 2 or 3 views; Future  - XR pelvis 1-2 views; Future  - XR skull complete 4+ views; Future  -  PTH, intact; Future      Left breast cancer (lumpectomy) last mammogram 11/2022- appt for mammogram next week.  Pap per gyn  Colonoscopy 11/2023 Dr Mahmood    Current Outpatient Medications on File Prior to Visit   Medication Sig Dispense Refill    cholecalciferol (Vitamin D-3) 25 MCG (1000 UT) capsule Take 2 capsules (50 mcg) by mouth once daily.      empagliflozin (Jardiance) 25 mg TAKE 1 TABLET(25 MG) BY MOUTH EVERY DAY 90 tablet 3    flash glucose scanning reader (FreeStyle María 2 [place]) misc Twice daily 1 each 0    flash glucose sensor kit (FreeStyle María 2 Sensor) kit Check twice daily 6 each 3    hydroCHLOROthiazide (HYDRODiuril) 25 mg tablet Take 1 tablet (25 mg) by mouth once daily. 90 tablet 3    metFORMIN  mg 24 hr tablet Take 4 tablets (2,000 mg) by mouth once daily. 360 tablet 3    pantoprazole (ProtoNix) 40 mg EC tablet Take 1 tablet (40 mg) by mouth once daily in the morning. Take before meals.      pravastatin (Pravachol) 40 mg tablet Take 1 tablet (40 mg) by mouth once daily. 90 tablet 3    semaglutide 0.25 mg or 0.5 mg (2 mg/3 mL) pen injector Inject 0.5 mg under the skin 1 (one) time per week. 3 mL 2    sertraline (Zoloft) 25 mg tablet Take 1 tablet (25 mg) by mouth once daily. 90 tablet 3     No current facility-administered medications on file prior to visit.     Answers submitted by the patient for this visit:  Diabetes Questionnaire (Submitted on 8/27/2024)  Chief Complaint: Diabetes problem  Diabetes type: type 2  MedicAlert ID: No  Symptom course: improving  CAD risks: dyslipidemia, hypertension, obesity, post-menopausal  Current treatments: oral agent (triple therapy)  Treatment compliance: all of the time  Home blood tests: 1-2 x per day  Home urines: <1 x per month  Monitoring compliance: excellent  Blood glucose trend: decreasing steadily  breakfast time: 9-10 am  breakfast glucose level: 110-130  lunch time: 1-2 pm  dinner time: 6-7 pm  Bedtime: after 11 pm  Bedtime glucose level:  110-130  High score: 130-140  Overall: 130-140  Weight trend: stable  Current diet: generally healthy  Meal planning: calorie counting  Exercise: three times a week  Dietitian visit: No  Eye exam current: Yes  Sees podiatrist: No

## 2024-08-28 ENCOUNTER — HOSPITAL ENCOUNTER (OUTPATIENT)
Dept: RADIOLOGY | Facility: HOSPITAL | Age: 59
Discharge: HOME | End: 2024-08-28
Payer: COMMERCIAL

## 2024-08-28 DIAGNOSIS — R74.8 ALKALINE PHOSPHATASE ELEVATION: ICD-10-CM

## 2024-08-28 DIAGNOSIS — E11.65 TYPE 2 DIABETES MELLITUS WITH HYPERGLYCEMIA, WITHOUT LONG-TERM CURRENT USE OF INSULIN (MULTI): ICD-10-CM

## 2024-08-28 PROCEDURE — 70250 X-RAY EXAM OF SKULL: CPT

## 2024-08-28 PROCEDURE — 72110 X-RAY EXAM L-2 SPINE 4/>VWS: CPT

## 2024-08-28 PROCEDURE — 72070 X-RAY EXAM THORAC SPINE 2VWS: CPT

## 2024-08-28 PROCEDURE — 73521 X-RAY EXAM HIPS BI 2 VIEWS: CPT

## 2024-08-28 PROCEDURE — 72040 X-RAY EXAM NECK SPINE 2-3 VW: CPT

## 2024-09-04 ENCOUNTER — E-CONSULT (OUTPATIENT)
Dept: ENDOCRINOLOGY | Facility: HOSPITAL | Age: 59
End: 2024-09-04
Payer: COMMERCIAL

## 2024-09-04 DIAGNOSIS — R74.8 ELEVATED ALKALINE PHOSPHATASE LEVEL: Primary | ICD-10-CM

## 2024-09-04 PROCEDURE — 99451 NTRPROF PH1/NTRNET/EHR 5/>: CPT | Performed by: INTERNAL MEDICINE

## 2024-09-04 NOTE — PROGRESS NOTES
Thank you for your question, her ALKP elevation is probably due to increased bone turnover rather than Paget's disease. However I suggest to run it by oncology just in case, they want bone survey.   I would also recommend PTH levels, as her calcium was 10, high normal.    I hope this is helpful    Lurdes Chris MD    1. Elevated alkaline phosphatase level

## 2024-09-05 ENCOUNTER — LAB (OUTPATIENT)
Dept: LAB | Facility: LAB | Age: 59
End: 2024-09-05
Payer: COMMERCIAL

## 2024-09-05 DIAGNOSIS — R74.8 ELEVATED ALKALINE PHOSPHATASE LEVEL: ICD-10-CM

## 2024-09-05 PROCEDURE — 36415 COLL VENOUS BLD VENIPUNCTURE: CPT

## 2024-09-05 PROCEDURE — 83970 ASSAY OF PARATHORMONE: CPT

## 2024-09-06 LAB — PTH-INTACT SERPL-MCNC: 42.1 PG/ML (ref 18.5–88)

## 2024-12-02 DIAGNOSIS — E78.1 HYPERTRIGLYCERIDEMIA: ICD-10-CM

## 2024-12-02 RX ORDER — PRAVASTATIN SODIUM 40 MG/1
40 TABLET ORAL DAILY
Qty: 90 TABLET | Refills: 3 | Status: SHIPPED | OUTPATIENT
Start: 2024-12-02

## 2024-12-13 ENCOUNTER — HOSPITAL ENCOUNTER (OUTPATIENT)
Dept: RADIOLOGY | Facility: CLINIC | Age: 59
Discharge: HOME | End: 2024-12-13
Payer: COMMERCIAL

## 2024-12-13 DIAGNOSIS — F32.A DEPRESSION, UNSPECIFIED DEPRESSION TYPE: ICD-10-CM

## 2024-12-13 DIAGNOSIS — Z12.31 SCREENING MAMMOGRAM FOR BREAST CANCER: ICD-10-CM

## 2024-12-13 PROCEDURE — 77067 SCR MAMMO BI INCL CAD: CPT

## 2024-12-13 RX ORDER — SERTRALINE HYDROCHLORIDE 25 MG/1
25 TABLET, FILM COATED ORAL DAILY
Qty: 90 TABLET | Refills: 3 | Status: SHIPPED | OUTPATIENT
Start: 2024-12-13

## 2024-12-23 ENCOUNTER — HOSPITAL ENCOUNTER (OUTPATIENT)
Dept: RADIOLOGY | Facility: EXTERNAL LOCATION | Age: 59
Discharge: HOME | End: 2024-12-23
Payer: COMMERCIAL

## 2025-01-06 ENCOUNTER — APPOINTMENT (OUTPATIENT)
Dept: PRIMARY CARE | Facility: CLINIC | Age: 60
End: 2025-01-06
Payer: COMMERCIAL

## 2025-01-08 ENCOUNTER — LAB (OUTPATIENT)
Dept: LAB | Facility: LAB | Age: 60
End: 2025-01-08
Payer: COMMERCIAL

## 2025-01-08 DIAGNOSIS — I10 HYPERTENSION, UNSPECIFIED TYPE: ICD-10-CM

## 2025-01-08 DIAGNOSIS — E11.69 TYPE 2 DIABETES MELLITUS WITH OTHER SPECIFIED COMPLICATION, UNSPECIFIED WHETHER LONG TERM INSULIN USE (MULTI): ICD-10-CM

## 2025-01-08 LAB
ALBUMIN SERPL BCP-MCNC: 4.2 G/DL (ref 3.4–5)
ALP SERPL-CCNC: 124 U/L (ref 33–136)
ALT SERPL W P-5'-P-CCNC: 28 U/L (ref 7–45)
ANION GAP SERPL CALC-SCNC: 12 MMOL/L (ref 10–20)
AST SERPL W P-5'-P-CCNC: 22 U/L (ref 9–39)
BILIRUB SERPL-MCNC: 0.5 MG/DL (ref 0–1.2)
BUN SERPL-MCNC: 10 MG/DL (ref 6–23)
CALCIUM SERPL-MCNC: 9.1 MG/DL (ref 8.6–10.3)
CHLORIDE SERPL-SCNC: 101 MMOL/L (ref 98–107)
CO2 SERPL-SCNC: 30 MMOL/L (ref 21–32)
CREAT SERPL-MCNC: 0.72 MG/DL (ref 0.5–1.05)
CREAT UR-MCNC: 102.7 MG/DL (ref 20–320)
EGFRCR SERPLBLD CKD-EPI 2021: >90 ML/MIN/1.73M*2
ERYTHROCYTE [DISTWIDTH] IN BLOOD BY AUTOMATED COUNT: 12.3 % (ref 11.5–14.5)
EST. AVERAGE GLUCOSE BLD GHB EST-MCNC: 143 MG/DL
GLUCOSE SERPL-MCNC: 135 MG/DL (ref 74–99)
HBA1C MFR BLD: 6.6 %
HCT VFR BLD AUTO: 47.4 % (ref 36–46)
HGB BLD-MCNC: 15.6 G/DL (ref 12–16)
MCH RBC QN AUTO: 31.4 PG (ref 26–34)
MCHC RBC AUTO-ENTMCNC: 32.9 G/DL (ref 32–36)
MCV RBC AUTO: 95 FL (ref 80–100)
MICROALBUMIN UR-MCNC: <7 MG/L
MICROALBUMIN/CREAT UR: NORMAL MG/G{CREAT}
NRBC BLD-RTO: 0 /100 WBCS (ref 0–0)
PLATELET # BLD AUTO: 251 X10*3/UL (ref 150–450)
POTASSIUM SERPL-SCNC: 4.1 MMOL/L (ref 3.5–5.3)
PROT SERPL-MCNC: 6.9 G/DL (ref 6.4–8.2)
RBC # BLD AUTO: 4.97 X10*6/UL (ref 4–5.2)
SODIUM SERPL-SCNC: 139 MMOL/L (ref 136–145)
WBC # BLD AUTO: 8.1 X10*3/UL (ref 4.4–11.3)

## 2025-01-08 PROCEDURE — 82570 ASSAY OF URINE CREATININE: CPT

## 2025-01-08 PROCEDURE — 83036 HEMOGLOBIN GLYCOSYLATED A1C: CPT

## 2025-01-08 PROCEDURE — 85027 COMPLETE CBC AUTOMATED: CPT

## 2025-01-08 PROCEDURE — 82043 UR ALBUMIN QUANTITATIVE: CPT

## 2025-01-08 PROCEDURE — 80053 COMPREHEN METABOLIC PANEL: CPT

## 2025-01-15 ENCOUNTER — APPOINTMENT (OUTPATIENT)
Dept: PRIMARY CARE | Facility: CLINIC | Age: 60
End: 2025-01-15
Payer: COMMERCIAL

## 2025-01-15 VITALS
DIASTOLIC BLOOD PRESSURE: 78 MMHG | WEIGHT: 159.4 LBS | BODY MASS INDEX: 29.33 KG/M2 | HEART RATE: 97 BPM | SYSTOLIC BLOOD PRESSURE: 122 MMHG | OXYGEN SATURATION: 99 % | HEIGHT: 62 IN | TEMPERATURE: 97.5 F

## 2025-01-15 DIAGNOSIS — E11.65 TYPE 2 DIABETES MELLITUS WITH HYPERGLYCEMIA, WITHOUT LONG-TERM CURRENT USE OF INSULIN: ICD-10-CM

## 2025-01-15 DIAGNOSIS — E78.00 HIGH CHOLESTEROL: ICD-10-CM

## 2025-01-15 DIAGNOSIS — Z23 NEED FOR PROPHYLACTIC VACCINATION AGAINST STREPTOCOCCUS PNEUMONIAE (PNEUMOCOCCUS): Primary | ICD-10-CM

## 2025-01-15 DIAGNOSIS — E55.9 VITAMIN D DEFICIENCY: ICD-10-CM

## 2025-01-15 PROCEDURE — 3044F HG A1C LEVEL LT 7.0%: CPT | Performed by: INTERNAL MEDICINE

## 2025-01-15 PROCEDURE — 3062F POS MACROALBUMINURIA REV: CPT | Performed by: INTERNAL MEDICINE

## 2025-01-15 PROCEDURE — 3078F DIAST BP <80 MM HG: CPT | Performed by: INTERNAL MEDICINE

## 2025-01-15 PROCEDURE — 90677 PCV20 VACCINE IM: CPT | Performed by: INTERNAL MEDICINE

## 2025-01-15 PROCEDURE — 3074F SYST BP LT 130 MM HG: CPT | Performed by: INTERNAL MEDICINE

## 2025-01-15 PROCEDURE — 3008F BODY MASS INDEX DOCD: CPT | Performed by: INTERNAL MEDICINE

## 2025-01-15 PROCEDURE — 1036F TOBACCO NON-USER: CPT | Performed by: INTERNAL MEDICINE

## 2025-01-15 PROCEDURE — 90471 IMMUNIZATION ADMIN: CPT | Performed by: INTERNAL MEDICINE

## 2025-01-15 PROCEDURE — 99214 OFFICE O/P EST MOD 30 MIN: CPT | Performed by: INTERNAL MEDICINE

## 2025-01-15 ASSESSMENT — ENCOUNTER SYMPTOMS
POLYPHAGIA: 0
SEIZURES: 0
NERVOUS/ANXIOUS: 0
FATIGUE: 0
TREMORS: 0
HUNGER: 0
WEIGHT LOSS: 0
CONFUSION: 0
WEAKNESS: 0
SPEECH DIFFICULTY: 0
BLACKOUTS: 0
VISUAL CHANGE: 0
HEADACHES: 0
DIZZINESS: 0
POLYDIPSIA: 0
SWEATS: 0
BLURRED VISION: 0

## 2025-01-15 ASSESSMENT — PAIN SCALES - GENERAL: PAINLEVEL_OUTOF10: 0-NO PAIN

## 2025-01-15 NOTE — PROGRESS NOTES
"Chief Complaint   Patient presents with    Follow-up     Pt here for 5 mo FUV     Last seen  2024.   Tolerating ozempic except sometimes dry heaves few days later  Insurance would not cover adrianne 2 she will check if covers other type.  Patient denies chest pain, pressure, palpitations, or shortness of breath.  Patient denies abdominal pain. No  complaints. Denies blood in urine or stool.  Denies fever, chills, or night sweats.       Past Medical History  Left breast cancer 2012 status post lumpectomy chemotherapy radiation Dr Oh (previous oncologist Dr Calero)  Diabetes mellitus type 2 diagnosed in 2012  Hypertriglyceridemia  Palpitations, [place].   Vitamin D deficiency  Carpal tunnel syndrome  Bilateral oophorectomy, uterus intact. Previous gynecologist Dr. Barrera  Sleep apnea CPAP 6  Fatty liver: FibroScan 2020 Dr Mahmood     Allergy Keflex  Social history former smoker.  she has 2 granddaughters. Quit smoking age 23     Family history: Mother is a patient. Father  70 with colon cancer, atrial fibrillation, and cerebral hemorrhage. Father had high triglycerides    Blood pressure 122/78, pulse 97, temperature 36.4 °C (97.5 °F), height 1.575 m (5' 2\"), weight 72.3 kg (159 lb 6.4 oz), SpO2 99%.  Body mass index is 29.15 kg/m².    Vital reviewed  Neck: no cervical/clavicular adenopathy  CV: RRR S1 S2 normal. No murmur. No carotid bruit.   Lungs: CTA without wrr. Breath sounds symmetric  Abdomen: normoactive. Soft, nontender. No mass.  Extremities: no pretibial edema  Neuro: speech intact.   Skin: no rash noted.     Labs 2025 MA, CBC, A1c, cmp    Lab Results   Component Value Date    WBC 8.1 2025    HGB 15.6 2025    HCT 47.4 (H) 2025    MCV 95 2025     2025     Lab Results   Component Value Date    GLUCOSE 135 (H) 2025    CALCIUM 9.1 2025     2025    K 4.1 2025    CO2 30 2025     2025    " "BUN 10 01/08/2025    CREATININE 0.72 01/08/2025     Lab Results   Component Value Date    CHOL 165 08/21/2024    CHOL 148 06/21/2021    CHOL 167 08/29/2019     Lab Results   Component Value Date    HDL 41.5 08/21/2024    HDL 41.0 06/21/2021    HDL 37.0 (A) 08/29/2019     Lab Results   Component Value Date    LDLCALC 64 08/21/2024     Lab Results   Component Value Date    TRIG 296 (H) 08/21/2024    TRIG 235 (H) 06/21/2021    TRIG 248 (H) 08/29/2019     No components found for: \"CHOLHDL\"   Lab Results   Component Value Date    TSH 2.44 07/08/2021      Lab Results   Component Value Date    HGBA1C 6.6 (H) 01/08/2025      Lab Results   Component Value Date    ALBUMINUR <7.0 01/08/2025      MA ratio 209      5/2024 A1c, lipid, cmp, d  A1c 7.4  Alk 177 cr 0.8  169//37/55/383    11/2023 lipid, A1c, MA, cmp, cbc  178/50/84/221  Glucose elevated 140 cr 0.8 lfts negative  A1c 6.7  MA ratio <4  Wbc 7.1 hg 14.3 platelet 247    1. Need for prophylactic vaccination against Streptococcus pneumoniae (pneumococcus) (Primary)  - Pneumococcal conjugate vaccine, 20-valent (PREVNAR 20)    2. Type 2 diabetes mellitus with hyperglycemia, without long-term current use of insulin  - Comprehensive Metabolic Panel; Future  - CBC; Future  - Hemoglobin A1C; Future  - Lipid Panel; Future    3. High cholesterol  - Comprehensive Metabolic Panel; Future  - Lipid Panel; Future    4. Vitamin D deficiency  - Vitamin D 25-Hydroxy,Total (for eval of Vitamin D levels); Future    Left breast cancer (lumpectomy. Mammogram 12/2024  Pap per gyn  Colonoscopy 11/2023 Dr Mahmood    Current Outpatient Medications on File Prior to Visit   Medication Sig Dispense Refill    cholecalciferol (Vitamin D-3) 25 MCG (1000 UT) capsule Take 2 capsules (50 mcg) by mouth once daily.      hydroCHLOROthiazide (Microzide) 12.5 mg tablet Take 1 tablet (12.5 mg) by mouth once daily. 90 tablet 3    metFORMIN  mg 24 hr tablet Take 4 tablets (2,000 mg) by mouth once daily. 360 " tablet 3    pantoprazole (ProtoNix) 40 mg EC tablet Take 1 tablet (40 mg) by mouth once daily in the morning. Take before meals.      pravastatin (Pravachol) 40 mg tablet Take 1 tablet (40 mg) by mouth once daily. 90 tablet 3    semaglutide 0.25 mg or 0.5 mg (2 mg/3 mL) pen injector Inject 0.5 mg under the skin 1 (one) time per week. 3 mL 11    sertraline (Zoloft) 25 mg tablet TAKE 1 TABLET(25 MG) BY MOUTH EVERY DAY 90 tablet 3    [DISCONTINUED] empagliflozin (Jardiance) 25 mg Take 1 tablet (25 mg) by mouth once daily. 90 tablet 3    flash glucose scanning reader (FreeStyle María 2 [place]) misc Twice daily (Patient not taking: Reported on 1/15/2025) 1 each 0    [DISCONTINUED] flash glucose sensor kit (FreeStyle María 2 Sensor) kit Check twice daily (Patient not taking: Reported on 1/15/2025) 6 each 3     No current facility-administered medications on file prior to visit.     Answers submitted by the patient for this visit:  Answers submitted by the patient for this visit:  Diabetes Questionnaire (Submitted on 1/15/2025)  Chief Complaint: Diabetes problem  Diabetes type: type 2  MedicAlert ID: No  Disease duration: 20 Years  blurred vision: No  chest pain: No  fatigue: No  foot paresthesias: No  foot ulcerations: No  polydipsia: No  polyphagia: No  polyuria: No  visual change: No  weakness: No  weight loss: No  Symptom course: stable  confusion: No  speech difficulty: No  dizziness: No  nervous/anxious: No  headaches: No  hunger: No  mood changes: No  pallor: No  seizures: No  tremors: No  sleepiness: No  sweats: No  blackouts: No  hospitalization: No  nocturnal hypoglycemia: No  required assistance: No  required glucagon: No  CVA: No  heart disease: No  impotence: No  nephropathy: No  peripheral neuropathy: No  PVD: No  retinopathy: No  CAD risks: dyslipidemia, obesity  Current treatments: oral agent (triple therapy)  Treatment compliance: all of the time  Home blood tests: 1-2 x per day  Home urines: <1 x per  month  Monitoring compliance: good  Blood glucose trend: no change  breakfast time: 7-8 am  breakfast glucose level: 110-130  lunch time: 1-2 pm  dinner time: 6-7 pm  Bedtime: after 11 pm  High score: 110-130  Overall: 110-130  Weight trend: increasing steadily  Current diet: generally healthy  Meal planning: calorie counting  Exercise: three times a week  Dietitian visit: No  Eye exam current: Yes  Sees podiatrist: No

## 2025-03-13 ENCOUNTER — TELEMEDICINE (OUTPATIENT)
Dept: PRIMARY CARE | Facility: CLINIC | Age: 60
End: 2025-03-13
Payer: COMMERCIAL

## 2025-03-13 DIAGNOSIS — J06.9 ACUTE URI: ICD-10-CM

## 2025-03-13 DIAGNOSIS — H10.9 CONJUNCTIVITIS OF BOTH EYES, UNSPECIFIED CONJUNCTIVITIS TYPE: Primary | ICD-10-CM

## 2025-03-13 PROCEDURE — 1036F TOBACCO NON-USER: CPT | Performed by: INTERNAL MEDICINE

## 2025-03-13 PROCEDURE — 3044F HG A1C LEVEL LT 7.0%: CPT | Performed by: INTERNAL MEDICINE

## 2025-03-13 PROCEDURE — 3062F POS MACROALBUMINURIA REV: CPT | Performed by: INTERNAL MEDICINE

## 2025-03-13 PROCEDURE — 99213 OFFICE O/P EST LOW 20 MIN: CPT | Performed by: INTERNAL MEDICINE

## 2025-03-13 RX ORDER — TOBRAMYCIN 3 MG/ML
1 SOLUTION/ DROPS OPHTHALMIC EVERY 4 HOURS
Qty: 5 ML | Refills: 0 | Status: SHIPPED | OUTPATIENT
Start: 2025-03-13

## 2025-03-13 RX ORDER — AZITHROMYCIN 250 MG/1
TABLET, FILM COATED ORAL
Qty: 6 TABLET | Refills: 0 | Status: SHIPPED | OUTPATIENT
Start: 2025-03-13

## 2025-03-13 NOTE — PROGRESS NOTES
"Chief Complaint   Patient presents with    Cough    Eye Drainage     Pt presents for virtual appt with c/o bilateral eye redness, burning, grittiness and drainage.  Onset 4 days.  Pt also c/o cough that is productive with green phlegm.  States \"I had a cold and then this happened,  I was watching my grandchildren last week and they had the same thing.\"  Pt unable to provide vital signs     Virtual or Telephone Consent    An interactive audio and video telecommunication system which permits real time communications between the patient (at the originating site) and provider (at the distant site) was utilized to provide this telehealth service.     Verbal consent was requested and obtained from Heather Logan on this date, 25 for a telehealth visit and the patient's location was confirmed at the time of the visit.  Pt in [place]    Last seen  .  Initially had URI symptoms with congestion and rhinorrhea then green nasal discharge that became bloody and then green again and then resolved then cough with green sputum no shortness of breath or wheezing.  Symptoms started 4 days ago.  Much better now but does feel that she needs an antibiotic.    Grandchildren had conjunctivitis she developed right eye redness and now has it on the left eye.  She does have drainage. Using compresses which helps.          Past Medical History  Left breast cancer  status post lumpectomy chemotherapy radiation Dr Oh (previous oncologist Dr Calero)  Diabetes mellitus type 2 diagnosed in   Hypertriglyceridemia  Palpitations, Dr Frederick [place].   Vitamin D deficiency  Carpal tunnel syndrome  Bilateral oophorectomy, uterus intact. Previous gynecologist Dr. Barrera  Sleep apnea CPAP 6  Fatty liver: FibroScan 2020 Dr Mahmood     Allergy [facility]  Social history former smoker.  she has 2 granddaughters. Quit smoking age 23     Family history: Mother is a patient. Father  70 with colon cancer, atrial " "fibrillation, and cerebral hemorrhage. Father had high triglycerides    There were no vitals taken for this visit.  There is no height or weight on file to calculate BMI.  Eyes: bilateral conjunctival erythema.     Labs 01/2025 MA, CBC, A1c, cmp    Lab Results   Component Value Date    WBC 8.1 01/08/2025    HGB 15.6 01/08/2025    HCT 47.4 (H) 01/08/2025    MCV 95 01/08/2025     01/08/2025     Lab Results   Component Value Date    GLUCOSE 135 (H) 01/08/2025    CALCIUM 9.1 01/08/2025     01/08/2025    K 4.1 01/08/2025    CO2 30 01/08/2025     01/08/2025    BUN 10 01/08/2025    CREATININE 0.72 01/08/2025     Lab Results   Component Value Date    CHOL 165 08/21/2024    CHOL 148 06/21/2021    CHOL 167 08/29/2019     Lab Results   Component Value Date    HDL 41.5 08/21/2024    HDL 41.0 06/21/2021    HDL 37.0 (A) 08/29/2019     Lab Results   Component Value Date    LDLCALC 64 08/21/2024     Lab Results   Component Value Date    TRIG 296 (H) 08/21/2024    TRIG 235 (H) 06/21/2021    TRIG 248 (H) 08/29/2019     No components found for: \"CHOLHDL\"   Lab Results   Component Value Date    TSH 2.44 07/08/2021      Lab Results   Component Value Date    HGBA1C 6.6 (H) 01/08/2025      Lab Results   Component Value Date    ALBUMINUR <7.0 01/08/2025      MA ratio 209      5/2024 A1c, lipid, cmp, d  A1c 7.4  Alk 177 cr 0.8  169//37/55/383    11/2023 lipid, A1c, MA, cmp, cbc  178/50/84/221  Glucose elevated 140 cr 0.8 lfts negative  A1c 6.7  MA ratio <4  Wbc 7.1 hg 14.3 platelet 247    1. Conjunctivitis of both eyes, unspecified conjunctivitis type (Primary)  Continue compresses.   If refractory to see ophtho. Pt aware and in agreement.   - tobramycin (Tobrex) 0.3 % ophthalmic solution; Administer 1 drop into both eyes every 4 hours.  Dispense: 5 mL; Refill: 0    2. Acute URI  - azithromycin (Zithromax) 250 mg tablet; Take 2 tabs (500 mg) by mouth today, than 1 daily for 4 days.  Dispense: 6 tablet; Refill: 0  Rtc if " refractory    Left breast cancer (lumpectomy. Mammogram 12/2024  Pap per gyn  Colonoscopy 11/2023 Dr Mahmood    Current Outpatient Medications on File Prior to Visit   Medication Sig Dispense Refill    cholecalciferol (Vitamin D-3) 25 MCG (1000 UT) capsule Take 2 capsules (50 mcg) by mouth once daily.      hydroCHLOROthiazide (Microzide) 12.5 mg tablet Take 1 tablet (12.5 mg) by mouth once daily. 90 tablet 3    metFORMIN  mg 24 hr tablet Take 4 tablets (2,000 mg) by mouth once daily. 360 tablet 3    pantoprazole (ProtoNix) 40 mg EC tablet Take 1 tablet (40 mg) by mouth once daily in the morning. Take before meals.      pravastatin (Pravachol) 40 mg tablet Take 1 tablet (40 mg) by mouth once daily. 90 tablet 3    semaglutide 0.25 mg or 0.5 mg (2 mg/3 mL) pen injector Inject 0.5 mg under the skin 1 (one) time per week. 3 mL 11    sertraline (Zoloft) 25 mg tablet TAKE 1 TABLET(25 MG) BY MOUTH EVERY DAY 90 tablet 3     No current facility-administered medications on file prior to visit.     Answers submitted by the patient for this visit:

## 2025-03-30 DIAGNOSIS — E11.69 TYPE 2 DIABETES MELLITUS WITH OTHER SPECIFIED COMPLICATION, UNSPECIFIED WHETHER LONG TERM INSULIN USE (MULTI): ICD-10-CM

## 2025-03-31 RX ORDER — EMPAGLIFLOZIN 25 MG/1
25 TABLET, FILM COATED ORAL DAILY
Qty: 90 TABLET | Refills: 3 | Status: SHIPPED | OUTPATIENT
Start: 2025-03-31

## 2025-04-24 DIAGNOSIS — E11.69 TYPE 2 DIABETES MELLITUS WITH OTHER SPECIFIED COMPLICATION, WITHOUT LONG-TERM CURRENT USE OF INSULIN: ICD-10-CM

## 2025-04-24 DIAGNOSIS — I10 HYPERTENSION, UNSPECIFIED TYPE: ICD-10-CM

## 2025-04-24 RX ORDER — HYDROCHLOROTHIAZIDE 12.5 MG/1
TABLET ORAL
Qty: 90 TABLET | Refills: 3 | Status: SHIPPED | OUTPATIENT
Start: 2025-04-24

## 2025-04-24 RX ORDER — METFORMIN HYDROCHLORIDE 500 MG/1
TABLET, EXTENDED RELEASE ORAL
Qty: 360 TABLET | Refills: 3 | Status: SHIPPED | OUTPATIENT
Start: 2025-04-24

## 2025-04-28 ENCOUNTER — APPOINTMENT (OUTPATIENT)
Dept: ENDOCRINOLOGY | Facility: CLINIC | Age: 60
End: 2025-04-28
Payer: COMMERCIAL

## 2025-07-10 LAB
25(OH)D3+25(OH)D2 SERPL-MCNC: 48 NG/ML (ref 30–100)
ALBUMIN SERPL-MCNC: 4.5 G/DL (ref 3.6–5.1)
ALP SERPL-CCNC: 152 U/L (ref 37–153)
ALT SERPL-CCNC: 21 U/L (ref 6–29)
ANION GAP SERPL CALCULATED.4IONS-SCNC: 11 MMOL/L (CALC) (ref 7–17)
AST SERPL-CCNC: 15 U/L (ref 10–35)
BILIRUB SERPL-MCNC: 0.4 MG/DL (ref 0.2–1.2)
BUN SERPL-MCNC: 20 MG/DL (ref 7–25)
CALCIUM SERPL-MCNC: 9 MG/DL (ref 8.6–10.4)
CHLORIDE SERPL-SCNC: 99 MMOL/L (ref 98–110)
CHOLEST SERPL-MCNC: 157 MG/DL
CHOLEST/HDLC SERPL: 4 (CALC)
CO2 SERPL-SCNC: 26 MMOL/L (ref 20–32)
CREAT SERPL-MCNC: 0.66 MG/DL (ref 0.5–1.05)
EGFRCR SERPLBLD CKD-EPI 2021: 100 ML/MIN/1.73M2
ERYTHROCYTE [DISTWIDTH] IN BLOOD BY AUTOMATED COUNT: 13.4 % (ref 11–15)
EST. AVERAGE GLUCOSE BLD GHB EST-MCNC: 151 MG/DL
EST. AVERAGE GLUCOSE BLD GHB EST-SCNC: 8.4 MMOL/L
GLUCOSE SERPL-MCNC: 157 MG/DL (ref 65–99)
HBA1C MFR BLD: 6.9 %
HCT VFR BLD AUTO: 50.2 % (ref 35–45)
HDLC SERPL-MCNC: 39 MG/DL
HGB BLD-MCNC: 16.7 G/DL (ref 11.7–15.5)
LDLC SERPL CALC-MCNC: 77 MG/DL (CALC)
MCH RBC QN AUTO: 32.1 PG (ref 27–33)
MCHC RBC AUTO-ENTMCNC: 33.3 G/DL (ref 32–36)
MCV RBC AUTO: 96.5 FL (ref 80–100)
NONHDLC SERPL-MCNC: 118 MG/DL (CALC)
PLATELET # BLD AUTO: 267 THOUSAND/UL (ref 140–400)
PMV BLD REES-ECKER: 9.6 FL (ref 7.5–12.5)
POTASSIUM SERPL-SCNC: 4.4 MMOL/L (ref 3.5–5.3)
PROT SERPL-MCNC: 7.1 G/DL (ref 6.1–8.1)
PTH-INTACT SERPL-MCNC: 31 PG/ML (ref 16–77)
RBC # BLD AUTO: 5.2 MILLION/UL (ref 3.8–5.1)
SODIUM SERPL-SCNC: 136 MMOL/L (ref 135–146)
TRIGL SERPL-MCNC: 341 MG/DL
WBC # BLD AUTO: 7.5 THOUSAND/UL (ref 3.8–10.8)

## 2025-07-14 ENCOUNTER — APPOINTMENT (OUTPATIENT)
Dept: PRIMARY CARE | Facility: CLINIC | Age: 60
End: 2025-07-14
Payer: COMMERCIAL

## 2025-07-14 VITALS
HEIGHT: 62 IN | TEMPERATURE: 98.2 F | SYSTOLIC BLOOD PRESSURE: 122 MMHG | OXYGEN SATURATION: 97 % | DIASTOLIC BLOOD PRESSURE: 80 MMHG | BODY MASS INDEX: 28.41 KG/M2 | HEART RATE: 94 BPM | WEIGHT: 154.4 LBS

## 2025-07-14 DIAGNOSIS — E78.1 HYPERTRIGLYCERIDEMIA: ICD-10-CM

## 2025-07-14 DIAGNOSIS — E11.65 TYPE 2 DIABETES MELLITUS WITH HYPERGLYCEMIA, WITHOUT LONG-TERM CURRENT USE OF INSULIN: ICD-10-CM

## 2025-07-14 DIAGNOSIS — Z11.59 NEED FOR HEPATITIS C SCREENING TEST: ICD-10-CM

## 2025-07-14 DIAGNOSIS — F32.A DEPRESSION, UNSPECIFIED DEPRESSION TYPE: ICD-10-CM

## 2025-07-14 DIAGNOSIS — Z11.4 SCREENING FOR HIV (HUMAN IMMUNODEFICIENCY VIRUS): ICD-10-CM

## 2025-07-14 DIAGNOSIS — Z00.00 ROUTINE GENERAL MEDICAL EXAMINATION AT A HEALTH CARE FACILITY: Primary | ICD-10-CM

## 2025-07-14 DIAGNOSIS — I10 BENIGN ESSENTIAL HYPERTENSION: ICD-10-CM

## 2025-07-14 DIAGNOSIS — E78.00 HIGH CHOLESTEROL: ICD-10-CM

## 2025-07-14 DIAGNOSIS — Z12.31 ENCOUNTER FOR SCREENING MAMMOGRAM FOR BREAST CANCER: ICD-10-CM

## 2025-07-14 PROCEDURE — 99213 OFFICE O/P EST LOW 20 MIN: CPT | Performed by: INTERNAL MEDICINE

## 2025-07-14 PROCEDURE — 1036F TOBACCO NON-USER: CPT | Performed by: INTERNAL MEDICINE

## 2025-07-14 PROCEDURE — 3079F DIAST BP 80-89 MM HG: CPT | Performed by: INTERNAL MEDICINE

## 2025-07-14 PROCEDURE — 99396 PREV VISIT EST AGE 40-64: CPT | Performed by: INTERNAL MEDICINE

## 2025-07-14 PROCEDURE — 3044F HG A1C LEVEL LT 7.0%: CPT | Performed by: INTERNAL MEDICINE

## 2025-07-14 PROCEDURE — 3074F SYST BP LT 130 MM HG: CPT | Performed by: INTERNAL MEDICINE

## 2025-07-14 PROCEDURE — 3008F BODY MASS INDEX DOCD: CPT | Performed by: INTERNAL MEDICINE

## 2025-07-14 PROCEDURE — 3062F POS MACROALBUMINURIA REV: CPT | Performed by: INTERNAL MEDICINE

## 2025-07-14 RX ORDER — SERTRALINE HYDROCHLORIDE 25 MG/1
25 TABLET, FILM COATED ORAL DAILY
Qty: 90 TABLET | Refills: 3 | Status: SHIPPED | OUTPATIENT
Start: 2025-07-14

## 2025-07-14 RX ORDER — PRAVASTATIN SODIUM 40 MG/1
40 TABLET ORAL DAILY
Qty: 90 TABLET | Refills: 3 | Status: SHIPPED | OUTPATIENT
Start: 2025-07-14

## 2025-07-14 SDOH — ECONOMIC STABILITY: FOOD INSECURITY: WITHIN THE PAST 12 MONTHS, YOU WORRIED THAT YOUR FOOD WOULD RUN OUT BEFORE YOU GOT MONEY TO BUY MORE.: NEVER TRUE

## 2025-07-14 SDOH — ECONOMIC STABILITY: FOOD INSECURITY: WITHIN THE PAST 12 MONTHS, THE FOOD YOU BOUGHT JUST DIDN'T LAST AND YOU DIDN'T HAVE MONEY TO GET MORE.: NEVER TRUE

## 2025-07-14 ASSESSMENT — LIFESTYLE VARIABLES
AUDIT-C TOTAL SCORE: 0
HOW MANY STANDARD DRINKS CONTAINING ALCOHOL DO YOU HAVE ON A TYPICAL DAY: PATIENT DOES NOT DRINK
HOW OFTEN DO YOU HAVE A DRINK CONTAINING ALCOHOL: NEVER
SKIP TO QUESTIONS 9-10: 1
HOW OFTEN DO YOU HAVE SIX OR MORE DRINKS ON ONE OCCASION: NEVER

## 2025-07-14 ASSESSMENT — COLUMBIA-SUICIDE SEVERITY RATING SCALE - C-SSRS
6. HAVE YOU EVER DONE ANYTHING, STARTED TO DO ANYTHING, OR PREPARED TO DO ANYTHING TO END YOUR LIFE?: NO
1. IN THE PAST MONTH, HAVE YOU WISHED YOU WERE DEAD OR WISHED YOU COULD GO TO SLEEP AND NOT WAKE UP?: NO
2. HAVE YOU ACTUALLY HAD ANY THOUGHTS OF KILLING YOURSELF?: NO

## 2025-07-14 ASSESSMENT — PROMIS GLOBAL HEALTH SCALE
CARRYOUT_SOCIAL_ACTIVITIES: EXCELLENT
CARRYOUT_PHYSICAL_ACTIVITIES: COMPLETELY
RATE_MENTAL_HEALTH: GOOD
RATE_PHYSICAL_HEALTH: GOOD
RATE_QUALITY_OF_LIFE: GOOD
RATE_AVERAGE_PAIN: 0
RATE_GENERAL_HEALTH: GOOD
RATE_SOCIAL_SATISFACTION: GOOD
EMOTIONAL_PROBLEMS: NEVER
RATE_AVERAGE_FATIGUE: MILD

## 2025-07-14 ASSESSMENT — PAIN SCALES - GENERAL: PAINLEVEL_OUTOF10: 2

## 2025-07-14 NOTE — PROGRESS NOTES
"Chief Complaint   Patient presents with    Follow-up    Annual Exam     Pt here for AWV and 6 mo FUV     Last  visit 2025 (tele for  URI symptoms with congestion and cough. Also conjunctivitis)   1 month vacation-went to [facility].   Had covid while on vacation-tested in [place]. Sugars high but was eating on the road for a month.   Left shoulder pain but moving a lot of things-they are doing remodeling. Prior left shoulder surgery. ROM intact.       Past Medical History  Left breast cancer  status post lumpectomy chemotherapy radiation Dr Oh (previous oncologist Dr Calero)  Diabetes mellitus type 2 diagnosed in   Hypertriglyceridemia  Palpitations, Dr Frederick [place].   Vitamin D deficiency  Carpal tunnel syndrome  Bilateral oophorectomy, uterus intact. Previous gynecologist Dr. Barrera  Sleep apnea CPAP 6  Fatty liver: FibroScan 2020 Dr Mahmood     Allergy Keflex  Social history former smoker.  she has 2 granddaughters. Quit smoking age 23     Family history: Mother is a patient. Father  70 with colon cancer, atrial fibrillation, and cerebral hemorrhage. Father had high triglycerides    Blood pressure 122/80, pulse 94, temperature 36.8 °C (98.2 °F), height 1.575 m (5' 2\"), weight 70 kg (154 lb 6.4 oz), SpO2 97%.  Body mass index is 28.24 kg/m².    General: NAD. Alert.   HEENT: Normocephalic atraumatic.    Eyes: no scleral icterus. Extraocular movements intact.  Pupils equal round and reactive to light.  Ears: TM intact.  No cerumen. Hearing grossly intact.   Throat: No exudate  Neck:  Supple. No thyroid goiter.  Lymph nodes: No cervical or clavicular adenopathy  Cardiovascular: Regular rate rhythm S1-S2 normal no murmur. No carotid bruit.   Lungs: Clear to Auscultation without wheezing, rales, or rhonchi, Breath sounds symmetric. No use of accessory muscles  Abdomen:  Normoactive bowel sounds, soft, non-tender. limited  Extremities: No pretibial edema  Neuro: no facial " "asymmetry. Strength upper and lower extremities 5/5. Sensation intact to light touch. No tremor. Babinski negative  Skin: no rash noted  Vascular: DP pulses intact.   Declined chaperone. Scar left lateral breast. Left breast smaller than right. No mass, discharge, or axillary adenopathy. No rash.       Lab Results   Component Value Date    WBC 7.5 07/09/2025    HGB 16.7 (H) 07/09/2025    HCT 50.2 (H) 07/09/2025    MCV 96.5 07/09/2025     07/09/2025     Lab Results   Component Value Date    GLUCOSE 157 (H) 07/09/2025    CALCIUM 9.0 07/09/2025     07/09/2025    K 4.4 07/09/2025    CO2 26 07/09/2025    CL 99 07/09/2025    BUN 20 07/09/2025    CREATININE 0.66 07/09/2025     Lab Results   Component Value Date    CHOL 157 07/09/2025    CHOL 165 08/21/2024    CHOL 148 06/21/2021     Lab Results   Component Value Date    HDL 39 (L) 07/09/2025    HDL 41.5 08/21/2024    HDL 41.0 06/21/2021     Lab Results   Component Value Date    LDLCALC 77 07/09/2025    LDLCALC 64 08/21/2024     Lab Results   Component Value Date    TRIG 341 (H) 07/09/2025    TRIG 296 (H) 08/21/2024    TRIG 235 (H) 06/21/2021     No components found for: \"CHOLHDL\"   Lab Results   Component Value Date    TSH 2.44 07/08/2021      Lab Results   Component Value Date    HGBA1C 6.9 (H) 07/09/2025      Lab Results   Component Value Date    ALBUMINUR <7.0 01/08/2025      MA ratio 209    Labs 01/2025 MA, CBC, A1c, cmp    5/2024 A1c, lipid, cmp, d  A1c 7.4  Alk 177 cr 0.8  169//37/55/383    11/2023 lipid, A1c, MA, cmp, cbc  178/50/84/221  Glucose elevated 140 cr 0.8 lfts negative  A1c 6.7  MA ratio <4  Wbc 7.1 hg 14.3 platelet 247    AWV  Living will: has a living will,  medical POA  Left breast cancer (lumpectomy. Mammogram 12/2024  Pap per gyn  Colonoscopy 11/2023 Dr Mahmood  Immunizations reviewed. Listed given to patient.     1. Routine general medical examination at a health care facility (Primary)    2. Encounter for screening mammogram for " breast cancer  - BI mammo bilateral screening tomosynthesis; Future    3. Type 2 diabetes mellitus with hyperglycemia, without long-term current use of insulin  Ldl not at goal but now back home and would like to work on lifestyle and not change medication at this time.   - CBC; Future  - Comprehensive Metabolic Panel; Future  - Albumin-Creatinine Ratio, Urine Random; Future  - Hemoglobin A1C; Future  - semaglutide 0.25 mg or 0.5 mg (2 mg/3 mL) pen injector; Inject 0.5 mg under the skin 1 (one) time per week.  Dispense: 3 mL; Refill: 11    4. Benign essential hypertension  - CBC; Future  - Lipid Panel; Future  - Comprehensive Metabolic Panel; Future    5. High cholesterol  - Lipid Panel; Future  - Comprehensive Metabolic Panel; Future    6. Screening for HIV (human immunodeficiency virus)  - HIV 1/2 Antigen/Antibody Screen with Reflex to Confirmation; Future    7. Need for hepatitis C screening test  - Hepatitis C Antibody; Future    8. Hypertriglyceridemia  - pravastatin (Pravachol) 40 mg tablet; Take 1 tablet (40 mg) by mouth once daily.  Dispense: 90 tablet; Refill: 3    9. Depression, unspecified depression type  - sertraline (Zoloft) 25 mg tablet; Take 1 tablet (25 mg) by mouth once daily.  Dispense: 90 tablet; Refill: 3    Current Outpatient Medications on File Prior to Visit   Medication Sig Dispense Refill    cholecalciferol (Vitamin D-3) 25 MCG (1000 UT) capsule Take 2 capsules (50 mcg) by mouth once daily.      hydroCHLOROthiazide (Microzide) 12.5 mg tablet TAKE 1 TABLET(12.5 MG) BY MOUTH DAILY 90 tablet 3    Jardiance 25 mg tablet TAKE 1 TABLET(25 MG) BY MOUTH EVERY DAY 90 tablet 3    metFORMIN XR (Glucophage-XR) 500 mg 24 hr tablet 4 po qd 360 tablet 3    pantoprazole (ProtoNix) 40 mg EC tablet Take 1 tablet (40 mg) by mouth once daily in the morning. Take before meals.      pravastatin (Pravachol) 40 mg tablet Take 1 tablet (40 mg) by mouth once daily. 90 tablet 3    semaglutide 0.25 mg or 0.5 mg (2  mg/3 mL) pen injector Inject 0.5 mg under the skin 1 (one) time per week. 3 mL 11    sertraline (Zoloft) 25 mg tablet TAKE 1 TABLET(25 MG) BY MOUTH EVERY DAY 90 tablet 3    azithromycin (Zithromax) 250 mg tablet Take 2 tabs (500 mg) by mouth today, than 1 daily for 4 days. 6 tablet 0    tobramycin (Tobrex) 0.3 % ophthalmic solution Administer 1 drop into both eyes every 4 hours. 5 mL 0     No current facility-administered medications on file prior to visit.

## 2025-08-17 DIAGNOSIS — E11.65 TYPE 2 DIABETES MELLITUS WITH HYPERGLYCEMIA, WITHOUT LONG-TERM CURRENT USE OF INSULIN: ICD-10-CM

## 2025-08-21 RX ORDER — SEMAGLUTIDE 0.68 MG/ML
0.5 INJECTION, SOLUTION SUBCUTANEOUS
Qty: 3 ML | Refills: 11 | OUTPATIENT
Start: 2025-08-21

## 2026-01-19 ENCOUNTER — APPOINTMENT (OUTPATIENT)
Dept: PRIMARY CARE | Facility: CLINIC | Age: 61
End: 2026-01-19
Payer: COMMERCIAL